# Patient Record
Sex: MALE | Race: WHITE | NOT HISPANIC OR LATINO | Employment: FULL TIME | URBAN - METROPOLITAN AREA
[De-identification: names, ages, dates, MRNs, and addresses within clinical notes are randomized per-mention and may not be internally consistent; named-entity substitution may affect disease eponyms.]

---

## 2020-10-15 ENCOUNTER — APPOINTMENT (OUTPATIENT)
Dept: GENERAL RADIOLOGY | Facility: HOSPITAL | Age: 65
End: 2020-10-15

## 2020-10-15 ENCOUNTER — HOSPITAL ENCOUNTER (INPATIENT)
Facility: HOSPITAL | Age: 65
LOS: 9 days | Discharge: HOME HEALTH | End: 2020-10-24
Attending: EMERGENCY MEDICINE | Admitting: HOSPITALIST

## 2020-10-15 DIAGNOSIS — J12.9 VIRAL PNEUMONIA: ICD-10-CM

## 2020-10-15 DIAGNOSIS — U07.1 PNEUMONIA DUE TO COVID-19 VIRUS: ICD-10-CM

## 2020-10-15 DIAGNOSIS — J12.82 PNEUMONIA DUE TO COVID-19 VIRUS: ICD-10-CM

## 2020-10-15 DIAGNOSIS — J96.01 ACUTE RESPIRATORY FAILURE WITH HYPOXIA (HCC): Primary | ICD-10-CM

## 2020-10-15 LAB
ALBUMIN SERPL-MCNC: 3.9 G/DL (ref 3.5–5.2)
ALBUMIN/GLOB SERPL: 1.5 G/DL
ALP SERPL-CCNC: 80 U/L (ref 39–117)
ALT SERPL W P-5'-P-CCNC: 40 U/L (ref 1–41)
ANION GAP SERPL CALCULATED.3IONS-SCNC: 11.8 MMOL/L (ref 5–15)
AST SERPL-CCNC: 46 U/L (ref 1–40)
B PARAPERT DNA SPEC QL NAA+PROBE: NOT DETECTED
B PERT DNA SPEC QL NAA+PROBE: NOT DETECTED
BASOPHILS # BLD AUTO: 0 10*3/MM3 (ref 0–0.2)
BASOPHILS NFR BLD AUTO: 0 % (ref 0–1.5)
BILIRUB SERPL-MCNC: 0.3 MG/DL (ref 0–1.2)
BUN SERPL-MCNC: 14 MG/DL (ref 8–23)
BUN/CREAT SERPL: 15.4 (ref 7–25)
C PNEUM DNA NPH QL NAA+NON-PROBE: NOT DETECTED
CALCIUM SPEC-SCNC: 9.2 MG/DL (ref 8.6–10.5)
CHLORIDE SERPL-SCNC: 99 MMOL/L (ref 98–107)
CO2 SERPL-SCNC: 24.2 MMOL/L (ref 22–29)
CREAT SERPL-MCNC: 0.91 MG/DL (ref 0.76–1.27)
D DIMER PPP FEU-MCNC: 0.63 MCGFEU/ML (ref 0–0.49)
D-LACTATE SERPL-SCNC: 1.2 MMOL/L (ref 0.5–2)
DEPRECATED RDW RBC AUTO: 43.2 FL (ref 37–54)
EOSINOPHIL # BLD AUTO: 0 10*3/MM3 (ref 0–0.4)
EOSINOPHIL NFR BLD AUTO: 0 % (ref 0.3–6.2)
ERYTHROCYTE [DISTWIDTH] IN BLOOD BY AUTOMATED COUNT: 13.6 % (ref 12.3–15.4)
FLUAV H1 2009 PAND RNA NPH QL NAA+PROBE: NOT DETECTED
FLUAV H1 HA GENE NPH QL NAA+PROBE: NOT DETECTED
FLUAV H3 RNA NPH QL NAA+PROBE: NOT DETECTED
FLUAV SUBTYP SPEC NAA+PROBE: NOT DETECTED
FLUBV RNA ISLT QL NAA+PROBE: NOT DETECTED
GFR SERPL CREATININE-BSD FRML MDRD: 83 ML/MIN/1.73
GLOBULIN UR ELPH-MCNC: 2.6 GM/DL
GLUCOSE SERPL-MCNC: 276 MG/DL (ref 65–99)
HADV DNA SPEC NAA+PROBE: NOT DETECTED
HCOV 229E RNA SPEC QL NAA+PROBE: NOT DETECTED
HCOV HKU1 RNA SPEC QL NAA+PROBE: NOT DETECTED
HCOV NL63 RNA SPEC QL NAA+PROBE: NOT DETECTED
HCOV OC43 RNA SPEC QL NAA+PROBE: NOT DETECTED
HCT VFR BLD AUTO: 44.4 % (ref 37.5–51)
HGB BLD-MCNC: 15.2 G/DL (ref 13–17.7)
HMPV RNA NPH QL NAA+NON-PROBE: NOT DETECTED
HPIV1 RNA SPEC QL NAA+PROBE: NOT DETECTED
HPIV2 RNA SPEC QL NAA+PROBE: NOT DETECTED
HPIV3 RNA NPH QL NAA+PROBE: NOT DETECTED
HPIV4 P GENE NPH QL NAA+PROBE: NOT DETECTED
LYMPHOCYTES # BLD AUTO: 0.75 10*3/MM3 (ref 0.7–3.1)
LYMPHOCYTES NFR BLD AUTO: 15.4 % (ref 19.6–45.3)
M PNEUMO IGG SER IA-ACNC: NOT DETECTED
MCH RBC QN AUTO: 29.9 PG (ref 26.6–33)
MCHC RBC AUTO-ENTMCNC: 34.2 G/DL (ref 31.5–35.7)
MCV RBC AUTO: 87.4 FL (ref 79–97)
MONOCYTES # BLD AUTO: 0.54 10*3/MM3 (ref 0.1–0.9)
MONOCYTES NFR BLD AUTO: 11.1 % (ref 5–12)
NEUTROPHILS NFR BLD AUTO: 3.55 10*3/MM3 (ref 1.7–7)
NEUTROPHILS NFR BLD AUTO: 72.9 % (ref 42.7–76)
PLATELET # BLD AUTO: 144 10*3/MM3 (ref 140–450)
PMV BLD AUTO: 11.4 FL (ref 6–12)
POTASSIUM SERPL-SCNC: 3.2 MMOL/L (ref 3.5–5.2)
PROCALCITONIN SERPL-MCNC: 0.06 NG/ML (ref 0–0.25)
PROT SERPL-MCNC: 6.5 G/DL (ref 6–8.5)
RBC # BLD AUTO: 5.08 10*6/MM3 (ref 4.14–5.8)
RHINOVIRUS RNA SPEC NAA+PROBE: NOT DETECTED
RSV RNA NPH QL NAA+NON-PROBE: NOT DETECTED
SARS-COV-2 RNA NPH QL NAA+NON-PROBE: DETECTED
SODIUM SERPL-SCNC: 135 MMOL/L (ref 136–145)
WBC # BLD AUTO: 4.87 10*3/MM3 (ref 3.4–10.8)

## 2020-10-15 PROCEDURE — 99284 EMERGENCY DEPT VISIT MOD MDM: CPT

## 2020-10-15 PROCEDURE — 83605 ASSAY OF LACTIC ACID: CPT | Performed by: NURSE PRACTITIONER

## 2020-10-15 PROCEDURE — G0378 HOSPITAL OBSERVATION PER HR: HCPCS

## 2020-10-15 PROCEDURE — 93010 ELECTROCARDIOGRAM REPORT: CPT | Performed by: INTERNAL MEDICINE

## 2020-10-15 PROCEDURE — 84145 PROCALCITONIN (PCT): CPT | Performed by: NURSE PRACTITIONER

## 2020-10-15 PROCEDURE — 80053 COMPREHEN METABOLIC PANEL: CPT | Performed by: NURSE PRACTITIONER

## 2020-10-15 PROCEDURE — 71045 X-RAY EXAM CHEST 1 VIEW: CPT

## 2020-10-15 PROCEDURE — 93005 ELECTROCARDIOGRAM TRACING: CPT | Performed by: NURSE PRACTITIONER

## 2020-10-15 PROCEDURE — 87040 BLOOD CULTURE FOR BACTERIA: CPT | Performed by: NURSE PRACTITIONER

## 2020-10-15 PROCEDURE — 25010000002 DEXAMETHASONE PER 1 MG: Performed by: EMERGENCY MEDICINE

## 2020-10-15 PROCEDURE — 85379 FIBRIN DEGRADATION QUANT: CPT | Performed by: EMERGENCY MEDICINE

## 2020-10-15 PROCEDURE — 85025 COMPLETE CBC W/AUTO DIFF WBC: CPT | Performed by: NURSE PRACTITIONER

## 2020-10-15 PROCEDURE — 0202U NFCT DS 22 TRGT SARS-COV-2: CPT | Performed by: NURSE PRACTITIONER

## 2020-10-15 RX ORDER — LOSARTAN POTASSIUM 50 MG/1
100 TABLET ORAL NIGHTLY
COMMUNITY

## 2020-10-15 RX ORDER — ACETAMINOPHEN 160 MG/5ML
650 SOLUTION ORAL EVERY 4 HOURS PRN
Status: DISCONTINUED | OUTPATIENT
Start: 2020-10-15 | End: 2020-10-24 | Stop reason: HOSPADM

## 2020-10-15 RX ORDER — ICOSAPENT ETHYL 1000 MG/1
2 CAPSULE ORAL NIGHTLY
COMMUNITY

## 2020-10-15 RX ORDER — ONDANSETRON 2 MG/ML
4 INJECTION INTRAMUSCULAR; INTRAVENOUS EVERY 6 HOURS PRN
Status: DISCONTINUED | OUTPATIENT
Start: 2020-10-15 | End: 2020-10-24 | Stop reason: HOSPADM

## 2020-10-15 RX ORDER — SODIUM CHLORIDE 0.9 % (FLUSH) 0.9 %
10 SYRINGE (ML) INJECTION EVERY 12 HOURS SCHEDULED
Status: DISCONTINUED | OUTPATIENT
Start: 2020-10-15 | End: 2020-10-24 | Stop reason: HOSPADM

## 2020-10-15 RX ORDER — SODIUM CHLORIDE 0.9 % (FLUSH) 0.9 %
10 SYRINGE (ML) INJECTION AS NEEDED
Status: DISCONTINUED | OUTPATIENT
Start: 2020-10-15 | End: 2020-10-24 | Stop reason: HOSPADM

## 2020-10-15 RX ORDER — DEXAMETHASONE SODIUM PHOSPHATE 4 MG/ML
6 INJECTION, SOLUTION INTRA-ARTICULAR; INTRALESIONAL; INTRAMUSCULAR; INTRAVENOUS; SOFT TISSUE ONCE
Status: COMPLETED | OUTPATIENT
Start: 2020-10-15 | End: 2020-10-15

## 2020-10-15 RX ORDER — ACETAMINOPHEN 650 MG/1
650 SUPPOSITORY RECTAL EVERY 4 HOURS PRN
Status: DISCONTINUED | OUTPATIENT
Start: 2020-10-15 | End: 2020-10-24 | Stop reason: HOSPADM

## 2020-10-15 RX ORDER — ACETAMINOPHEN 325 MG/1
650 TABLET ORAL EVERY 4 HOURS PRN
Status: DISCONTINUED | OUTPATIENT
Start: 2020-10-15 | End: 2020-10-24 | Stop reason: HOSPADM

## 2020-10-15 RX ORDER — NITROGLYCERIN 0.4 MG/1
0.4 TABLET SUBLINGUAL
Status: DISCONTINUED | OUTPATIENT
Start: 2020-10-15 | End: 2020-10-24 | Stop reason: HOSPADM

## 2020-10-15 RX ORDER — AMLODIPINE BESYLATE 10 MG/1
10 TABLET ORAL NIGHTLY
COMMUNITY
End: 2020-10-24 | Stop reason: HOSPADM

## 2020-10-15 RX ORDER — POTASSIUM CHLORIDE 750 MG/1
40 CAPSULE, EXTENDED RELEASE ORAL ONCE
Status: COMPLETED | OUTPATIENT
Start: 2020-10-15 | End: 2020-10-15

## 2020-10-15 RX ORDER — TAMSULOSIN HYDROCHLORIDE 0.4 MG/1
1 CAPSULE ORAL NIGHTLY
COMMUNITY

## 2020-10-15 RX ADMIN — POTASSIUM CHLORIDE 40 MEQ: 10 CAPSULE, COATED, EXTENDED RELEASE ORAL at 19:08

## 2020-10-15 RX ADMIN — DEXAMETHASONE SODIUM PHOSPHATE 6 MG: 4 INJECTION, SOLUTION INTRAMUSCULAR; INTRAVENOUS at 19:29

## 2020-10-15 NOTE — ED PROVIDER NOTES
EMERGENCY DEPARTMENT ENCOUNTER    Room Number:  35/35  Date seen:  10/15/2020  Time seen: 17:23 EDT  PCP: Provider, No Known  Historian: patient    HPI:  Chief complaint:difficulty breathing  A complete HPI/ROS/PMH/PSH/SH/FH are unobtainable due to: n/a  Context:Donta Maria is a 65 y.o. male who presents to the ED with c/o 24 hours of worsening shortness of breath described as moderate related to his COVID 19 which he was informed about on Monday.  He states it is made worse by exertion and not better by anything.  He has nausea, fever that does seem to respond to Tylenol arthritis formula, headache and body aches.  States he is completely miserable.  He has h/o RA but is not on any medications currently for this.  He has no history of asthma and has not smoked for many years.      Patient was placed in face mask in first look. Patient was wearing facemask when I entered the room and throughout our encounter. I wore full protective equipment throughout this patient encounter including a face mask, gown, eye shield and gloves. Hand hygiene/washing of hands was performed before donning protective equipment and after removal when leaving the room.      MEDICAL RECORD REVIEW    ALLERGIES  Lisinopril    PAST MEDICAL HISTORY  Active Ambulatory Problems     Diagnosis Date Noted   • No Active Ambulatory Problems     Resolved Ambulatory Problems     Diagnosis Date Noted   • No Resolved Ambulatory Problems     No Additional Past Medical History       PAST SURGICAL HISTORY  No past surgical history on file.    FAMILY HISTORY  No family history on file.    SOCIAL HISTORY  Social History     Socioeconomic History   • Marital status: Single     Spouse name: Not on file   • Number of children: Not on file   • Years of education: Not on file   • Highest education level: Not on file       REVIEW OF SYSTEMS  Review of Systems    All systems reviewed and negative except for those discussed in HPI.     PHYSICAL EXAM    ED Triage  Vitals   Temp Heart Rate Resp BP SpO2   10/15/20 1649 10/15/20 1645 10/15/20 1645 10/15/20 1647 10/15/20 1645   99.7 °F (37.6 °C) 81 20 120/84 (!) 87 %      Temp src Heart Rate Source Patient Position BP Location FiO2 (%)   10/15/20 1649 10/15/20 1645 10/15/20 1647 10/15/20 1647 --   Tympanic Monitor Sitting Right arm      Physical Exam    I have reviewed the triage vital signs and nursing notes.      GENERAL: mild distress, not toxic appearing  HENT: nares patent  EYES: no scleral icterus  NECK: no ROM limitations  CV: regular rhythm, regular rate, no murmur, no rubs, no gallups  RESPIRATORY: increased WOB, appears mildly short of breath, he is not normally on any oxygen.  He has diminished breath sounds  ABDOMEN: soft  : deferred  MUSCULOSKELETAL: no deformity  NEURO: alert, moves all extremities, follows commands  SKIN: warm, dry    LAB RESULTS  Recent Results (from the past 24 hour(s))   Comprehensive Metabolic Panel    Collection Time: 10/15/20  6:08 PM    Specimen: Blood   Result Value Ref Range    Glucose 276 (H) 65 - 99 mg/dL    BUN 14 8 - 23 mg/dL    Creatinine 0.91 0.76 - 1.27 mg/dL    Sodium 135 (L) 136 - 145 mmol/L    Potassium 3.2 (L) 3.5 - 5.2 mmol/L    Chloride 99 98 - 107 mmol/L    CO2 24.2 22.0 - 29.0 mmol/L    Calcium 9.2 8.6 - 10.5 mg/dL    Total Protein 6.5 6.0 - 8.5 g/dL    Albumin 3.90 3.50 - 5.20 g/dL    ALT (SGPT) 40 1 - 41 U/L    AST (SGOT) 46 (H) 1 - 40 U/L    Alkaline Phosphatase 80 39 - 117 U/L    Total Bilirubin 0.3 0.0 - 1.2 mg/dL    eGFR Non African Amer 83 >60 mL/min/1.73    Globulin 2.6 gm/dL    A/G Ratio 1.5 g/dL    BUN/Creatinine Ratio 15.4 7.0 - 25.0    Anion Gap 11.8 5.0 - 15.0 mmol/L   Lactic Acid, Plasma    Collection Time: 10/15/20  6:08 PM    Specimen: Blood   Result Value Ref Range    Lactate 1.2 0.5 - 2.0 mmol/L   CBC Auto Differential    Collection Time: 10/15/20  6:08 PM    Specimen: Blood   Result Value Ref Range    WBC 4.87 3.40 - 10.80 10*3/mm3    RBC 5.08 4.14 -  5.80 10*6/mm3    Hemoglobin 15.2 13.0 - 17.7 g/dL    Hematocrit 44.4 37.5 - 51.0 %    MCV 87.4 79.0 - 97.0 fL    MCH 29.9 26.6 - 33.0 pg    MCHC 34.2 31.5 - 35.7 g/dL    RDW 13.6 12.3 - 15.4 %    RDW-SD 43.2 37.0 - 54.0 fl    MPV 11.4 6.0 - 12.0 fL    Platelets 144 140 - 450 10*3/mm3    Neutrophil % 72.9 42.7 - 76.0 %    Lymphocyte % 15.4 (L) 19.6 - 45.3 %    Monocyte % 11.1 5.0 - 12.0 %    Eosinophil % 0.0 (L) 0.3 - 6.2 %    Basophil % 0.0 0.0 - 1.5 %    Neutrophils, Absolute 3.55 1.70 - 7.00 10*3/mm3    Lymphocytes, Absolute 0.75 0.70 - 3.10 10*3/mm3    Monocytes, Absolute 0.54 0.10 - 0.90 10*3/mm3    Eosinophils, Absolute 0.00 0.00 - 0.40 10*3/mm3    Basophils, Absolute 0.00 0.00 - 0.20 10*3/mm3   Procalcitonin    Collection Time: 10/15/20  6:08 PM    Specimen: Blood   Result Value Ref Range    Procalcitonin 0.06 0.00 - 0.25 ng/mL         RADIOLOGY RESULTS  XR Chest AP   Final Result            PROGRESS, DATA ANALYSIS, CONSULTS AND MEDICAL DECISION MAKING  All labs have been independently reviewed by me.  All radiology studies have been reviewed by me and discussed with radiologist dictating the report.  EKG's independently viewed and interpreted by me unless stated otherwise. Discussion below represents my analysis of pertinent findings related to patient's condition, differential diagnosis, treatment plan and final disposition.     ED Course as of Oct 15 2013   Thu Oct 15, 2020   1736 Oxygen saturations 90% on 4 liters per minute.     [EW]   1807 Discussed CXR with Dr. Barber, Radiologist concern for right sided pneumonia.     [EW]   1815 Pt had positive covid 19 on 10/8/2020, he is worse.  I did not repeat covid 19.     [EW]   1849 EKG          EKG time: 1748  Rhythm/Rate: 75, sinus rhythm  P waves and CT: normal CT, normal BISI  QRS, axis: normal QRS, LAFB  ST and T waves: no acute ST/T wave abnormalities    Interpreted Contemporaneously by me, independently viewed  No prior available for comparison      [EW]  "  1854 I have ordered repeat covid testing.     [EW]   1920 I discussed patient with Dr. Nash on call for [facility], he agrees to admit and requested d-dimer.  He states we do not need to wait for dimer results prior to admission.      [EW]      ED Course User Index  [EW] Lanie Manuel, APRN     DDX: covid 19, covid 19 pneumonia, acute hypoxia    MDM: Pt with new onset hypoxia and was 87% on RA when he arrived.  Still with oxygen saturations 92% on 4 liters.  He feels terrible.  Other labs are not acute.  D-dimer pending at time of admission and Dr. Nash aware.  Pt did receive potassium and decadron in ED.     Reviewed pt's history and workup with Dr. Artis.  After a bedside evaluation, Dr. Artis agrees with the plan of care.    Based on the patient's lab findings and presenting symptoms, the doctor and I feel it is appropriate to admit the patient for further management, evaluation, and treatment.  I have discussed this with the admitting team.  I have also discussed this with the patient/family.  They are in agreement with admission.          Disposition vitals:  /82   Pulse 73   Temp 99.7 °F (37.6 °C) (Tympanic)   Resp 20   Ht 180.3 cm (71\")   Wt 102 kg (225 lb)   SpO2 93%   BMI 31.38 kg/m²       DIAGNOSIS  Final diagnoses:   Acute respiratory failure with hypoxia (CMS/HCC)   Pneumonia due to COVID-19 virus       Admission     Lanie Manuele, APRN  10/15/20 2015    "

## 2020-10-15 NOTE — ED PROVIDER NOTES
Pt presents to the ED c/o  24 hours of increasing shortness of breath with recent diagnosis of COVID on Monday.  Reports fatigue, body aches, diarrhea.     On exam,   General: Awake, alert, ill-appearing  HEENT: Mucous membranes moist, atraumatic, normocephalic, EOMI  Neck: Full ROM  Pulm: Symmetric, mild tachypnea, mild scattered rales  Cardiovascular: Regular rate and rhythm, normal S1/S2, intact distal pulses  GI: Soft, nontender, nondistended, no rebound, no guarding, bowel sounds present  MSK: Full ROM, no deformity  Skin: Warm, dry  Neuro: Alert and oriented x 3, GCS 15, moving all extremities, no focal deficits  Psych: Calm, cooperative      Surgical mask, protective eye goggles, and gloves used during this encounter. Patient in surgical mask.    Critical Care  Performed by: Stu Artis MD  Authorized by: Stu Artis MD     Critical care provider statement:     Critical care time (minutes):  34    Critical care time was exclusive of:  Separately billable procedures and treating other patients    Critical care was necessary to treat or prevent imminent or life-threatening deterioration of the following conditions:  Respiratory failure    Critical care was time spent personally by me on the following activities:  Development of treatment plan with patient or surrogate, discussions with consultants, evaluation of patient's response to treatment, examination of patient, obtaining history from patient or surrogate, ordering and performing treatments and interventions, ordering and review of laboratory studies, ordering and review of radiographic studies, pulse oximetry, re-evaluation of patient's condition and review of old charts (COVID pneumonia with hypoxia requiring supplemental O2 and steroids)          Plan:   ED Course as of Oct 15 2129   Thu Oct 15, 2020   1736 Oxygen saturations 90% on 4 liters per minute.     [EW]   1807 Discussed CXR with Dr. Barber, Radiologist concern for right sided pneumonia.      [EW]   1815 Pt had positive covid 19 on 10/8/2020, he is worse.  I did not repeat covid 19.     [EW]   1849 EKG          EKG time: 1748  Rhythm/Rate: 75, sinus rhythm  P waves and UT: normal UT, normal BISI  QRS, axis: normal QRS, LAFB  ST and T waves: no acute ST/T wave abnormalities    Interpreted Contemporaneously by me, independently viewed  No prior available for comparison      [EW]   1854 I have ordered repeat covid testing.     [EW]   1920 I discussed patient with Dr. Nash on call for [facility], he agrees to admit and requested d-dimer.  He states we do not need to wait for dimer results prior to admission.      [EW]      ED Course User Index  [EW] Lanie Manuel APRN     Plan for hospitalization with the pneumonia noted on x-ray, the hypoxia requiring oxygen supplementation.  Will give him a dose of steroids.     Attestation:  The YEN and I have discussed this patient's history, physical exam, and treatment plan.  I have reviewed the documentation and personally had a face to face interaction with the patient. I affirm the documentation and agree with the treatment and plan.  The attached note describes my personal findings.          Stu Artis MD  10/15/20 2131       Stu Artis MD  10/15/20 2133

## 2020-10-15 NOTE — ED NOTES
Pt presents to ED with complaints of COVID+ results two days ago. Pt complaints of increased SOA and loss of appetite. Pt complaints of cough and reports a fever at home. Pt is A&OX4, able to ambulate but placed in wheelchair, and in a mask at this time.     Bin Senior, RN  10/15/20 7846

## 2020-10-16 PROBLEM — E87.6 HYPOKALEMIA: Status: ACTIVE | Noted: 2020-10-16

## 2020-10-16 PROBLEM — E87.1 HYPONATREMIA: Status: ACTIVE | Noted: 2020-10-16

## 2020-10-16 PROBLEM — U07.1 PNEUMONIA DUE TO COVID-19 VIRUS: Status: ACTIVE | Noted: 2020-10-16

## 2020-10-16 PROBLEM — R79.89 LFTS ABNORMAL: Status: ACTIVE | Noted: 2020-10-16

## 2020-10-16 PROBLEM — J12.82 PNEUMONIA DUE TO COVID-19 VIRUS: Status: ACTIVE | Noted: 2020-10-16

## 2020-10-16 PROBLEM — J12.9 VIRAL PNEUMONIA: Status: ACTIVE | Noted: 2020-10-16

## 2020-10-16 LAB
ABO GROUP BLD: NORMAL
ALBUMIN SERPL-MCNC: 3.4 G/DL (ref 3.5–5.2)
ALP SERPL-CCNC: 82 U/L (ref 39–117)
ALT SERPL W P-5'-P-CCNC: 35 U/L (ref 1–41)
AST SERPL-CCNC: 37 U/L (ref 1–40)
BILIRUB CONJ SERPL-MCNC: <0.2 MG/DL (ref 0–0.3)
BILIRUB INDIRECT SERPL-MCNC: ABNORMAL MG/DL
BILIRUB SERPL-MCNC: 0.3 MG/DL (ref 0–1.2)
BLD GP AB SCN SERPL QL: NEGATIVE
CREAT SERPL-MCNC: 0.84 MG/DL (ref 0.76–1.27)
CRP SERPL-MCNC: 5.86 MG/DL (ref 0–0.5)
DEPRECATED RDW RBC AUTO: 42.3 FL (ref 37–54)
ERYTHROCYTE [DISTWIDTH] IN BLOOD BY AUTOMATED COUNT: 13.3 % (ref 12.3–15.4)
GFR SERPL CREATININE-BSD FRML MDRD: 91 ML/MIN/1.73
HCT VFR BLD AUTO: 42.6 % (ref 37.5–51)
HGB BLD-MCNC: 14.8 G/DL (ref 13–17.7)
MCH RBC QN AUTO: 30.3 PG (ref 26.6–33)
MCHC RBC AUTO-ENTMCNC: 34.7 G/DL (ref 31.5–35.7)
MCV RBC AUTO: 87.3 FL (ref 79–97)
PLATELET # BLD AUTO: 143 10*3/MM3 (ref 140–450)
PMV BLD AUTO: 11.6 FL (ref 6–12)
PROT SERPL-MCNC: 6.5 G/DL (ref 6–8.5)
RBC # BLD AUTO: 4.88 10*6/MM3 (ref 4.14–5.8)
RH BLD: POSITIVE
T&S EXPIRATION DATE: NORMAL
WBC # BLD AUTO: 3.8 10*3/MM3 (ref 3.4–10.8)

## 2020-10-16 PROCEDURE — 86850 RBC ANTIBODY SCREEN: CPT | Performed by: INTERNAL MEDICINE

## 2020-10-16 PROCEDURE — 25010000002 ENOXAPARIN PER 10 MG: Performed by: HOSPITALIST

## 2020-10-16 PROCEDURE — 63710000001 DEXAMETHASONE PER 0.25 MG: Performed by: HOSPITALIST

## 2020-10-16 PROCEDURE — 86140 C-REACTIVE PROTEIN: CPT | Performed by: INTERNAL MEDICINE

## 2020-10-16 PROCEDURE — 86901 BLOOD TYPING SEROLOGIC RH(D): CPT | Performed by: INTERNAL MEDICINE

## 2020-10-16 PROCEDURE — 99255 IP/OBS CONSLTJ NEW/EST HI 80: CPT | Performed by: INTERNAL MEDICINE

## 2020-10-16 PROCEDURE — 36415 COLL VENOUS BLD VENIPUNCTURE: CPT | Performed by: NURSE PRACTITIONER

## 2020-10-16 PROCEDURE — 82565 ASSAY OF CREATININE: CPT | Performed by: INTERNAL MEDICINE

## 2020-10-16 PROCEDURE — 86900 BLOOD TYPING SEROLOGIC ABO: CPT | Performed by: INTERNAL MEDICINE

## 2020-10-16 PROCEDURE — 80076 HEPATIC FUNCTION PANEL: CPT | Performed by: INTERNAL MEDICINE

## 2020-10-16 PROCEDURE — 85027 COMPLETE CBC AUTOMATED: CPT | Performed by: NURSE PRACTITIONER

## 2020-10-16 PROCEDURE — XW033E5 INTRODUCTION OF REMDESIVIR ANTI-INFECTIVE INTO PERIPHERAL VEIN, PERCUTANEOUS APPROACH, NEW TECHNOLOGY GROUP 5: ICD-10-PCS | Performed by: INTERNAL MEDICINE

## 2020-10-16 RX ORDER — FAMOTIDINE 20 MG/1
20 TABLET, FILM COATED ORAL
Status: DISCONTINUED | OUTPATIENT
Start: 2020-10-16 | End: 2020-10-24 | Stop reason: HOSPADM

## 2020-10-16 RX ORDER — LOSARTAN POTASSIUM 100 MG/1
100 TABLET ORAL NIGHTLY
Status: DISCONTINUED | OUTPATIENT
Start: 2020-10-16 | End: 2020-10-24 | Stop reason: HOSPADM

## 2020-10-16 RX ORDER — AMLODIPINE BESYLATE 10 MG/1
10 TABLET ORAL
Status: DISCONTINUED | OUTPATIENT
Start: 2020-10-16 | End: 2020-10-18

## 2020-10-16 RX ORDER — TAMSULOSIN HYDROCHLORIDE 0.4 MG/1
0.4 CAPSULE ORAL NIGHTLY
Status: DISCONTINUED | OUTPATIENT
Start: 2020-10-16 | End: 2020-10-24 | Stop reason: HOSPADM

## 2020-10-16 RX ADMIN — FAMOTIDINE 20 MG: 20 TABLET, FILM COATED ORAL at 12:34

## 2020-10-16 RX ADMIN — DEXAMETHASONE 6 MG: 4 TABLET ORAL at 08:50

## 2020-10-16 RX ADMIN — FAMOTIDINE 20 MG: 20 TABLET, FILM COATED ORAL at 18:00

## 2020-10-16 RX ADMIN — LOSARTAN POTASSIUM 100 MG: 100 TABLET, FILM COATED ORAL at 19:59

## 2020-10-16 RX ADMIN — TAMSULOSIN HYDROCHLORIDE 0.4 MG: 0.4 CAPSULE ORAL at 19:59

## 2020-10-16 RX ADMIN — AMLODIPINE BESYLATE 10 MG: 10 TABLET ORAL at 12:34

## 2020-10-16 RX ADMIN — SODIUM CHLORIDE, PRESERVATIVE FREE 10 ML: 5 INJECTION INTRAVENOUS at 19:59

## 2020-10-16 RX ADMIN — ENOXAPARIN SODIUM 40 MG: 40 INJECTION SUBCUTANEOUS at 08:50

## 2020-10-16 RX ADMIN — REMDESIVIR 200 MG: 100 INJECTION, POWDER, LYOPHILIZED, FOR SOLUTION INTRAVENOUS at 15:31

## 2020-10-16 NOTE — PLAN OF CARE
Goal Outcome Evaluation:  Plan of Care Reviewed With: patient  Progress: no change  Outcome Summary: Remdesivir and Convalescent Plasma ordered per ID.  Took Decadron this AM.  Pt currently on 7lpm N/C satting 91%.  Will change to hi flow with humidifier.  VSS.  Will cont to monitor.

## 2020-10-16 NOTE — PROGRESS NOTES
[facility]  Clinical Pharmacy Department     Remdesivir Review Note    Donta Maria is a 65 y.o. male with confirmed COVID-19 infection on day 2 of hospitalization.     Consulting Provider:  Dr Garcia  Date of Confirmed SARS-CoV-2: 10/8/20  Date of Symptom Onset: 10/4/20  Planned Duration of Therapy: 5 days  Other Antimicrobials: no  Hydroxychloroquine or chloroquine prior to arrival: no    Allergies  Allergies as of 10/15/2020 - Reviewed 10/15/2020   Allergen Reaction Noted    Lisinopril Other (See Comments) 03/19/2019       Microbiology:  Microbiology Results (last 10 days)       Procedure Component Value - Date/Time    Respiratory Panel PCR w/COVID-19(SARS-CoV-2) [facility] In-House, NP Swab in UTM/VTM, 3-4 HR TAT - Swab, Nasopharynx [[id number]]  (Abnormal) Collected: 10/15/20 1911    Lab Status: Final result Specimen: Swab from Nasopharynx Updated: 10/15/20 2128     ADENOVIRUS, PCR Not Detected     Coronavirus 229E Not Detected     Coronavirus HKU1 Not Detected     Coronavirus NL63 Not Detected     Coronavirus OC43 Not Detected     COVID19 Detected     Human Metapneumovirus Not Detected     Human Rhinovirus/Enterovirus Not Detected     Influenza A PCR Not Detected     Influenza A H1 Not Detected     Influenza A H1 2009 PCR Not Detected     Influenza A H3 Not Detected     Influenza B PCR Not Detected     Parainfluenza Virus 1 Not Detected     Parainfluenza Virus 2 Not Detected     Parainfluenza Virus 3 Not Detected     Parainfluenza Virus 4 Not Detected     RSV, PCR Not Detected     Bordetella pertussis pcr Not Detected     Bordetella parapertussis PCR Not Detected     Chlamydophila pneumoniae PCR Not Detected     Mycoplasma pneumo by PCR Not Detected    Narrative:      Fact sheet for providers: https://docs.[employer].InboundWriter/wp-content/uploads/SIV5837-5436-FR3.1-EUA-Provider-Fact-Sheet-3.pdf    Fact sheet for patients:  https://docs.[employer]/wp-content/uploads/KJZ0989-7335-DP1.1-EUA-Patient-Fact-Sheet-1.pdf            Vitals/Labs/I&O  [unfilled]    Results from last 7 days   Lab Units 10/16/20  0745 10/15/20  1808   WBC 10*3/mm3 3.80 4.87     Results from last 7 days   Lab Units 10/15/20  1808   PROCALCITONIN ng/mL 0.06     Results from last 7 days   Lab Units 10/16/20  1325 10/15/20  1808   AST (SGOT) U/L 37 46*      Results from last 7 days   Lab Units 10/16/20  1325 10/15/20  1808   ALT (SGPT) U/L 35 40       Estimated Creatinine Clearance: 101.3 mL/min (by C-G formula based on SCr of 0.84 mg/dL).  Results from last 7 days   Lab Units 10/16/20  1325 10/15/20  1808   BUN mg/dL  --  14   CREATININE mg/dL 0.84 0.91     Intake & Output (last 3 days)         10/13 0701 - 10/14 0700 10/14 0701 - 10/15 0700 10/15 0701 - 10/16 0700 10/16 0701 - 10/17 0700    P.O.    240    Total Intake(mL/kg)    240 (2.6)    Net    +240                    Assessment/Plan:    Patient is hospitalized with confirmed, severe COVID-19 infection and started on remdesivir 200 mg IV once followed by 100 mg IV daily for 5 days. All requirements listed below have been reviewed and meet the requirement for Emergency Use Authorization (EUA) of remdesivir for COVID-19 infection.   Patient is hospitalized with confirmed COVID-19 infection  Documentation of consent and the fact sheet provided to patient or caregiver in the EHR  Baseline and daily LFTs and Scr have been ordered prior to remdesivir initiation  ALT is not ? 5 times the upper limit of normal  Patient is not on concomitant hydroxychloroquine or chloroquine       Thank you for involving pharmacy in this patient's care. Please contact pharmacy with any questions or concerns.                           Aminah Garrido Pharm.D., [facility]  Clinical Pharmacist  10/16/20 14:16 EDT

## 2020-10-16 NOTE — CONSULTS
Referring Provider: Dr Schroeder for COVID    Subjective   History of present illness: 65-year-old with history of rheumatoid arthritis, on no immunosuppression, diabetes mellitus type 2 on metformin and hypertension we are asked to see for COVID.  He says he started feeling unwell on 10/4/2020 when he developed headache, myalgias, intermittent fevers, nonproductive cough and shortness of breath.  He tested positive for COVID-19 on 10/8/2020.  He progressively worsened and came to the emergency department yesterday evening.  He was admitted and started on supplemental oxygen up to 7 L now and dexamethasone.  He does feel better with these interventions, but is far from his baseline.  He denies any loss of taste or smell but has a poor appetite.    Past medical history: Diabetes mellitus 2, hypertension, RA  No family history of infection or COVID  Social history: He works with [employer] where he thinks he got COVID.  Non-smoker.  Lives in [place], [place] alone      Allergies   Allergen Reactions   • Lisinopril Other (See Comments)     Dry cough       Review of Systems  Pertinent items are noted in HPI, all other systems reviewed and negative    Objective     Physical Exam:   Vital Signs   Temp:  [97.4 °F (36.3 °C)-99.7 °F (37.6 °C)] 97.4 °F (36.3 °C)  Heart Rate:  [64-81] 64  Resp:  [18-22] 22  BP: (120-132)/(67-84) 132/74    GENERAL: Awake and alert, in no acute distress.   HEENT: Oropharynx is clear. Hearing is grossly normal.   EYES: PERRL. No conjunctival injection. No lid lag.   LYMPHATICS: No lymphadenopathy of the neck or inguinal regions.   HEART: Regular rate and regular rhythm. No peripheral edema.   LUNGS: distant with normal respiratory effort.   GI: Soft, nontender, nondistended. No appreciable organomegaly.   SKIN: Warm and dry without cutaneous eruptions   PSYCHIATRIC: Appropriate mood, affect, insight, and judgment.     Results Review:  WBC 3.8    Hgb 14.8  PCT 0.06  LFTs nl except ast  46  ddimer 0.63  Microbiology:  10/15 BCx 2/2 ngtd  10/15 RPP COVID19+    Radiology:   CXR, independently interpreted: No penelope pna. Possible RLL GGO per radiology    Assessment/Plan   1.  COVID-19 pneumonia/lung injury/evolving ARDS  2.  Acute hypoxic respiratory failure secondary #1    I suspect that the patient is entering into a hyper inflammatory/cytokine storm phase of the infection.  I will check CRP.  I agree with dexamethasone and supplemental oxygen.  I discussed with him experimental therapies with Remdesivir and convalescent plasma.  Fact sheets on both provided to the patient.  He is agreeable to proceed with both.  Type and screen ordered.  Remdesivir for 5-day course ordered as dosed by pharmacy.  Recommend checking a daily liver function test    He understands that if these therapies provide any benefit whatsoever, they are likely only to provide at most a modest benefit.      I have discussed with the patient the following regarding Remdesivir    1. FDA has authorized emergency use of Remdesivir which is not FDA approved    2. The patient or caregiver has the option to accept or refuse administration of Remdesivir    3. The significant known and potential risks and benefits of Remdesivir and the extent to which such risks and benefits are unknown     4. Information on available alternative treatments and the risks and benefits of those alternatives.   I have discussed with the patient the following regarding convalescent plasma    1. FDA has authorized emergency use of COVID-19 convalescent plasma, which is not an FDA-approved biological product     2. The patient or caregiver has the option to accept or refuse administration of COVID-19 convalescent plasma     3. The significant known and potential risks and benefits of COVID-19 convalescent plasma and the extent to which such risks and benefits are unknown     4. Information on available alternative treatments and the risks and benefits of those  alternatives.       Beyond the above interventions, I have nothing more to offer him.    Therefore I will sign off       Stuart Garcia MD  10/16/20  12:01 EDT

## 2020-10-16 NOTE — PLAN OF CARE
Goal Outcome Evaluation:  Plan of Care Reviewed With: patient  Progress: no change  Outcome Summary: Pt A & O, up ad pedro, on 4L O2 NC. Medicated per orders. No s/s of distress at this time, VSS, will cont to monitor.

## 2020-10-16 NOTE — PLAN OF CARE
Goal Outcome Evaluation:  Plan of Care Reviewed With: patient  Progress: no change  Pt admitted last night.  Pt gets SOA on exertion and is on 4lpm versus RA at home.  VSS.  Will cont to monitor.

## 2020-10-16 NOTE — H&P
HISTORY AND PHYSICAL   [facility]        Patient Identification:  Name: Donta Maria  Age: 65 y.o.  Sex: male  :  1955  MRN: [id number]                     Primary Care Physician: Provider, No Known    Chief Complaint: Shortness of breath    History of Present Illness:   Mr Maria is a wonderfully pleasant 65-year-old male who does have a past medical history of diabetes of which he takes metformin.  He thinks his previous A1c was around the 8 range and he is followed by endocrinologist Dr. Mcfadden in an outpatient setting.  He is here today because he has had a week's worth of some unusual symptoms consistent with cold-like illness.  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  He states he is been around numerous exposures with people tested positive for COVID.  He had been seen in his PCP I believe fairly recently and was prescribed some amoxicillin which she completed the course and did not have any improvement.  Along the way he is never been on any steroids or inhaled medications.  He denies any COPD and denies tobacco usage.  He continued to have shortness of breath which was starting to stop him in his tracks so he came to the ER for further evaluation.  He was found to be COVID positive.  He is also had associated complaints such as headache nausea decreased appetite but denies any emesis or abdominal pain.  He is had a couple loose stools along the way.  Since admission patient's O2 is currently 7 L via nasal cannula.  He is short of breath with exertion even with use oxygen though during my exam he was staying around 93%.      Past Medical History:  Past Medical History:   Diagnosis Date   • Diabetes mellitus (CMS/[facility])    • Hypertension    • Rheumatoid arthritis (CMS/[facility])    • Rheumatoid arthritis (CMS/[facility])      Past Surgical History:  History reviewed. No pertinent surgical history.   Home Meds:  Medications Prior to Admission    Medication Sig Dispense Refill Last Dose   • amLODIPine (NORVASC) 10 MG tablet Take 10 mg by mouth Every Night.      • icosapent ethyl (Vascepa) 1 g capsule capsule Take 2 g by mouth Every Night.      • losartan (COZAAR) 50 MG tablet Take 100 mg by mouth Every Night.      • metFORMIN (GLUCOPHAGE) 1000 MG tablet Take 2,000 mg by mouth Every Night.      • tamsulosin (FLOMAX) 0.4 MG capsule 24 hr capsule Take 1 capsule by mouth Every Night.          Allergies:  Allergies   Allergen Reactions   • Lisinopril Other (See Comments)     Dry cough     Immunizations:    There is no immunization history on file for this patient.  Social History:   Social History     Social History Narrative   • Not on file     Social History     Socioeconomic History   • Marital status: Single     Spouse name: Not on file   • Number of children: Not on file   • Years of education: Not on file   • Highest education level: Not on file   Tobacco Use   • Smoking status: Former Smoker     Types: Cigarettes     Quit date: 11/15/2008     Years since quittin.9   • Smokeless tobacco: Former User   Substance and Sexual Activity   • Alcohol use: Yes     Comment: rarely   • Sexual activity: Defer       Family History:  History reviewed. No pertinent family history.     Review of Systems  See history of present illness and past medical history.  Patient admits to subjective fevers and chills but denies night sweats.  Admits to some nausea without emesis though has occasional loose stools.  Denies any neck stiffness or rigidity focal changes to vision smell taste or sound.  Patient will have any issues of chest pain or palpitations but admits to mainly dry cough as well as shortness of breath worse with exertion.  Denies abdominal pain dysuria bruising bleeding focal loss of function denies any tightness or redness to bilateral lower extremities.  Remainder of ROS is negative.    Objective:  T Max 24 hrs: Temp (24hrs), Av.9 °F (37.2 °C), Min:97.8  "°F (36.6 °C), Max:99.7 °F (37.6 °C)    Vitals Ranges:   Temp:  [97.8 °F (36.6 °C)-99.7 °F (37.6 °C)] 97.8 °F (36.6 °C)  Heart Rate:  [64-81] 64  Resp:  [18-20] 18  BP: (120-131)/(67-84) 123/67      Exam:  /67 (BP Location: Right arm, Patient Position: Lying)   Pulse 64   Temp 97.8 °F (36.6 °C) (Oral)   Resp 18   Ht 180.3 cm (71\")   Wt 94 kg (207 lb 3.7 oz)   SpO2 95%   BMI 28.90 kg/m²     General Appearance:    Alert, cooperative, sickly though alert and oriented x3, he does get little short of breath with conversation, no family at bedside   Head:    Normocephalic, without obvious abnormality, atraumatic   Eyes:    PERRL, conjunctivae/corneas clear, EOM's intact, both eyes   Ears:    Normal external ear canals, both ears   Nose:   Nares normal, septum midline, mucosa normal, no drainage    or sinus tenderness   Throat:   Lips, mucosa, and tongue normal   Neck:   Supple, no meningismus or LAD or JVD   Back:     Symmetric, no curvature   Lungs:    Surprisingly clear to auscultation bilaterally, respirations unlabored but you can tell he gets short of breath at times with conversation.  He did have some mild rhonchi noted in the mid right lung but but that cleared with deep breathing   Chest Wall:    No tenderness or deformity    Heart:    Regular rate and rhythm, S1 and S2 normal, no murmur   Abdomen:     Soft, nontender, bowel sounds active all four quadrants,     no masses   Extremities:   Extremities normal, atraumatic, no cyanosis or edema, negative Homans sign   Pulses:   2+ and symmetric all extremities   Skin:   Skin color, texture, turgor normal, no rashes or lesions   Lymph nodes:   Cervical and supraclavicular nodes normal   Neurologic:   CNII-XII intact, normal strength, normal gait      .    Data Review:  Labs in chart were reviewed.             Imaging Results (All)     Procedure Component Value Units Date/Time    XR Chest AP [[id number]] Collected: 10/15/20 1811     Updated: 10/15/20 1818    " Narrative:      STAT PORTABLE RADIOGRAPHIC VIEW OF THE CHEST      CLINICAL HISTORY: Covid evaluation.     FINDINGS: Stat portable radiographic view of the chest demonstrates  vague areas of increased density within the right lung worrisome for  ground-glass infiltrates. These findings could be confirmed with a CT  scan of the chest, if clinically indicated. The left lung is clear. The  cardiomediastinal silhouette is within normal limits. The osseous  structures are unremarkable.     These findings and recommendations were discussed with Lanie Manuel on  10/15/2020 at approximately 5:49 PM.     This report was finalized on 10/15/2020 6:15 PM by Dr. Norm Barber M.D.               Assessment:    Pneumonia due to COVID-19 virus    Acute respiratory failure with hypoxia (CMS/HCC)    Viral pneumonia    Hypokalemia    Hyponatremia    LFTs abnormal      Plan:    Viral pneumonia secondary to COVID with resulting acute hypoxic respiratory failure as patient currently on 7 L   -Dexamethasone with Pepcid for GI prophylaxis   -ID consultation to determine if qualifies for additional treatment   -Denies any past history of underlying lung disease or cardiac disease but admits to diabetes   -Mildly elevated d-dimer will start on Lovenox at prophylactic dosing    Replace K and check mag with a.m. labs    Hyponatremia is mild and no need for further work-up    Mild elevation of LFTs and will continue to monitor especially if patient is started on Remdesivir    Further recommendations to follow his clinical course unfolds    Alvaro Schroeder MD  10/16/2020  07:24 EDT

## 2020-10-17 ENCOUNTER — APPOINTMENT (OUTPATIENT)
Dept: GENERAL RADIOLOGY | Facility: HOSPITAL | Age: 65
End: 2020-10-17

## 2020-10-17 LAB
ALBUMIN SERPL-MCNC: 3.3 G/DL (ref 3.5–5.2)
ALBUMIN/GLOB SERPL: 1.3 G/DL
ALP SERPL-CCNC: 72 U/L (ref 39–117)
ALT SERPL W P-5'-P-CCNC: 28 U/L (ref 1–41)
ANION GAP SERPL CALCULATED.3IONS-SCNC: 8.4 MMOL/L (ref 5–15)
AST SERPL-CCNC: 33 U/L (ref 1–40)
BH BB BLOOD EXPIRATION DATE: NORMAL
BH BB BLOOD TYPE BARCODE: 6200
BH BB DISPENSE STATUS: NORMAL
BH BB PRODUCT CODE: NORMAL
BH BB UNIT NUMBER: NORMAL
BILIRUB CONJ SERPL-MCNC: <0.2 MG/DL (ref 0–0.3)
BILIRUB SERPL-MCNC: 0.2 MG/DL (ref 0–1.2)
BUN SERPL-MCNC: 17 MG/DL (ref 8–23)
BUN/CREAT SERPL: 20.2 (ref 7–25)
CALCIUM SPEC-SCNC: 8.7 MG/DL (ref 8.6–10.5)
CHLORIDE SERPL-SCNC: 102 MMOL/L (ref 98–107)
CK SERPL-CCNC: 68 U/L (ref 20–200)
CO2 SERPL-SCNC: 26.6 MMOL/L (ref 22–29)
CREAT SERPL-MCNC: 0.84 MG/DL (ref 0.76–1.27)
CRP SERPL-MCNC: 3.22 MG/DL (ref 0–0.5)
D DIMER PPP FEU-MCNC: 0.9 MCGFEU/ML (ref 0–0.49)
GFR SERPL CREATININE-BSD FRML MDRD: 91 ML/MIN/1.73
GLOBULIN UR ELPH-MCNC: 2.6 GM/DL
GLUCOSE SERPL-MCNC: 300 MG/DL (ref 65–99)
LDH SERPL-CCNC: 423 U/L (ref 135–225)
MAGNESIUM SERPL-MCNC: 2.1 MG/DL (ref 1.6–2.4)
POTASSIUM SERPL-SCNC: 3.8 MMOL/L (ref 3.5–5.2)
PROT SERPL-MCNC: 5.9 G/DL (ref 6–8.5)
SODIUM SERPL-SCNC: 137 MMOL/L (ref 136–145)
UNIT  ABO: NORMAL
UNIT  RH: NORMAL

## 2020-10-17 PROCEDURE — 71045 X-RAY EXAM CHEST 1 VIEW: CPT

## 2020-10-17 PROCEDURE — 83735 ASSAY OF MAGNESIUM: CPT | Performed by: HOSPITALIST

## 2020-10-17 PROCEDURE — 94799 UNLISTED PULMONARY SVC/PX: CPT

## 2020-10-17 PROCEDURE — 83615 LACTATE (LD) (LDH) ENZYME: CPT | Performed by: HOSPITALIST

## 2020-10-17 PROCEDURE — 86140 C-REACTIVE PROTEIN: CPT | Performed by: HOSPITALIST

## 2020-10-17 PROCEDURE — 82550 ASSAY OF CK (CPK): CPT | Performed by: HOSPITALIST

## 2020-10-17 PROCEDURE — 82248 BILIRUBIN DIRECT: CPT | Performed by: INTERNAL MEDICINE

## 2020-10-17 PROCEDURE — 63710000001 DEXAMETHASONE PER 0.25 MG: Performed by: HOSPITALIST

## 2020-10-17 PROCEDURE — 80053 COMPREHEN METABOLIC PANEL: CPT | Performed by: HOSPITALIST

## 2020-10-17 PROCEDURE — 85379 FIBRIN DEGRADATION QUANT: CPT | Performed by: HOSPITALIST

## 2020-10-17 PROCEDURE — 25010000002 ENOXAPARIN PER 10 MG: Performed by: HOSPITALIST

## 2020-10-17 RX ORDER — UREA 10 %
3 LOTION (ML) TOPICAL NIGHTLY PRN
Status: DISCONTINUED | OUTPATIENT
Start: 2020-10-17 | End: 2020-10-24 | Stop reason: HOSPADM

## 2020-10-17 RX ORDER — DEXAMETHASONE SODIUM PHOSPHATE 10 MG/ML
6 INJECTION, SOLUTION INTRAMUSCULAR; INTRAVENOUS EVERY 12 HOURS
Status: DISCONTINUED | OUTPATIENT
Start: 2020-10-17 | End: 2020-10-23

## 2020-10-17 RX ADMIN — REMDESIVIR 100 MG: 100 INJECTION, POWDER, LYOPHILIZED, FOR SOLUTION INTRAVENOUS at 13:39

## 2020-10-17 RX ADMIN — AMLODIPINE BESYLATE 10 MG: 10 TABLET ORAL at 08:10

## 2020-10-17 RX ADMIN — FAMOTIDINE 20 MG: 20 TABLET, FILM COATED ORAL at 17:59

## 2020-10-17 RX ADMIN — Medication 3 MG: at 20:32

## 2020-10-17 RX ADMIN — DEXAMETHASONE 6 MG: 4 TABLET ORAL at 08:09

## 2020-10-17 RX ADMIN — FAMOTIDINE 20 MG: 20 TABLET, FILM COATED ORAL at 08:09

## 2020-10-17 RX ADMIN — SODIUM CHLORIDE, PRESERVATIVE FREE 10 ML: 5 INJECTION INTRAVENOUS at 20:32

## 2020-10-17 RX ADMIN — SODIUM CHLORIDE, PRESERVATIVE FREE 10 ML: 5 INJECTION INTRAVENOUS at 08:09

## 2020-10-17 RX ADMIN — TAMSULOSIN HYDROCHLORIDE 0.4 MG: 0.4 CAPSULE ORAL at 20:32

## 2020-10-17 RX ADMIN — LOSARTAN POTASSIUM 100 MG: 100 TABLET, FILM COATED ORAL at 20:32

## 2020-10-17 RX ADMIN — ENOXAPARIN SODIUM 40 MG: 40 INJECTION SUBCUTANEOUS at 08:09

## 2020-10-17 NOTE — PLAN OF CARE
Goal Outcome Evaluation:  Plan of Care Reviewed With: patient  Progress: no change   Patient alert follows commands, no c/o pain or nausea noted. 7 lpm oxygen, tolerating wake and asleep. Plans to titrate during the day. No acute distress noted. Plasma finished at beginning of nightshift. Will continue to monitor

## 2020-10-17 NOTE — PLAN OF CARE
Goal Outcome Evaluation:  Plan of Care Reviewed With: patient  Progress: improving  Outcome Summary: Mild SOA with activity. 02 5L NC to maintain sats > 90%. Remdsivir IV given. Using IS. Telemetry SR.

## 2020-10-18 LAB
ALBUMIN SERPL-MCNC: 3.4 G/DL (ref 3.5–5.2)
ALP SERPL-CCNC: 78 U/L (ref 39–117)
ALT SERPL W P-5'-P-CCNC: 28 U/L (ref 1–41)
AST SERPL-CCNC: 26 U/L (ref 1–40)
BASOPHILS # BLD AUTO: 0.01 10*3/MM3 (ref 0–0.2)
BASOPHILS NFR BLD AUTO: 0.2 % (ref 0–1.5)
BILIRUB CONJ SERPL-MCNC: <0.2 MG/DL (ref 0–0.3)
BILIRUB INDIRECT SERPL-MCNC: ABNORMAL MG/DL
BILIRUB SERPL-MCNC: 0.4 MG/DL (ref 0–1.2)
CREAT SERPL-MCNC: 0.86 MG/DL (ref 0.76–1.27)
CRP SERPL-MCNC: 3.96 MG/DL (ref 0–0.5)
D DIMER PPP FEU-MCNC: 0.66 MCGFEU/ML (ref 0–0.49)
DEPRECATED RDW RBC AUTO: 43 FL (ref 37–54)
EOSINOPHIL # BLD AUTO: 0 10*3/MM3 (ref 0–0.4)
EOSINOPHIL NFR BLD AUTO: 0 % (ref 0.3–6.2)
ERYTHROCYTE [DISTWIDTH] IN BLOOD BY AUTOMATED COUNT: 13.2 % (ref 12.3–15.4)
GFR SERPL CREATININE-BSD FRML MDRD: 89 ML/MIN/1.73
GLUCOSE BLDC GLUCOMTR-MCNC: 474 MG/DL (ref 70–130)
HCT VFR BLD AUTO: 42.2 % (ref 37.5–51)
HGB BLD-MCNC: 14.2 G/DL (ref 13–17.7)
IMM GRANULOCYTES # BLD AUTO: 0.06 10*3/MM3 (ref 0–0.05)
IMM GRANULOCYTES NFR BLD AUTO: 1.1 % (ref 0–0.5)
LYMPHOCYTES # BLD AUTO: 0.92 10*3/MM3 (ref 0.7–3.1)
LYMPHOCYTES NFR BLD AUTO: 16.3 % (ref 19.6–45.3)
MCH RBC QN AUTO: 29.7 PG (ref 26.6–33)
MCHC RBC AUTO-ENTMCNC: 33.6 G/DL (ref 31.5–35.7)
MCV RBC AUTO: 88.3 FL (ref 79–97)
MONOCYTES # BLD AUTO: 0.38 10*3/MM3 (ref 0.1–0.9)
MONOCYTES NFR BLD AUTO: 6.7 % (ref 5–12)
NEUTROPHILS NFR BLD AUTO: 4.27 10*3/MM3 (ref 1.7–7)
NEUTROPHILS NFR BLD AUTO: 75.7 % (ref 42.7–76)
NRBC BLD AUTO-RTO: 0 /100 WBC (ref 0–0.2)
PLATELET # BLD AUTO: 177 10*3/MM3 (ref 140–450)
PMV BLD AUTO: 11.6 FL (ref 6–12)
PROCALCITONIN SERPL-MCNC: <0.02 NG/ML (ref 0–0.25)
PROT SERPL-MCNC: 6.1 G/DL (ref 6–8.5)
RBC # BLD AUTO: 4.78 10*6/MM3 (ref 4.14–5.8)
WBC # BLD AUTO: 5.64 10*3/MM3 (ref 3.4–10.8)

## 2020-10-18 PROCEDURE — 93005 ELECTROCARDIOGRAM TRACING: CPT | Performed by: INTERNAL MEDICINE

## 2020-10-18 PROCEDURE — 80076 HEPATIC FUNCTION PANEL: CPT | Performed by: INTERNAL MEDICINE

## 2020-10-18 PROCEDURE — 25010000002 ENOXAPARIN PER 10 MG: Performed by: HOSPITALIST

## 2020-10-18 PROCEDURE — 99253 IP/OBS CNSLTJ NEW/EST LOW 45: CPT | Performed by: INTERNAL MEDICINE

## 2020-10-18 PROCEDURE — 93010 ELECTROCARDIOGRAM REPORT: CPT | Performed by: INTERNAL MEDICINE

## 2020-10-18 PROCEDURE — 63710000001 INSULIN LISPRO (HUMAN) PER 5 UNITS: Performed by: NURSE PRACTITIONER

## 2020-10-18 PROCEDURE — 82565 ASSAY OF CREATININE: CPT | Performed by: INTERNAL MEDICINE

## 2020-10-18 PROCEDURE — 94799 UNLISTED PULMONARY SVC/PX: CPT

## 2020-10-18 PROCEDURE — 85025 COMPLETE CBC W/AUTO DIFF WBC: CPT | Performed by: INTERNAL MEDICINE

## 2020-10-18 PROCEDURE — 84145 PROCALCITONIN (PCT): CPT | Performed by: INTERNAL MEDICINE

## 2020-10-18 PROCEDURE — 25010000002 DEXAMETHASONE SODIUM PHOSPHATE 10 MG/ML SOLUTION: Performed by: INTERNAL MEDICINE

## 2020-10-18 PROCEDURE — 86140 C-REACTIVE PROTEIN: CPT | Performed by: HOSPITALIST

## 2020-10-18 PROCEDURE — 82962 GLUCOSE BLOOD TEST: CPT

## 2020-10-18 PROCEDURE — 85379 FIBRIN DEGRADATION QUANT: CPT | Performed by: HOSPITALIST

## 2020-10-18 RX ORDER — DEXTROSE MONOHYDRATE 25 G/50ML
25 INJECTION, SOLUTION INTRAVENOUS
Status: DISCONTINUED | OUTPATIENT
Start: 2020-10-18 | End: 2020-10-24 | Stop reason: HOSPADM

## 2020-10-18 RX ORDER — NICOTINE POLACRILEX 4 MG
15 LOZENGE BUCCAL
Status: DISCONTINUED | OUTPATIENT
Start: 2020-10-18 | End: 2020-10-24 | Stop reason: HOSPADM

## 2020-10-18 RX ORDER — AMLODIPINE BESYLATE 5 MG/1
5 TABLET ORAL
Status: DISCONTINUED | OUTPATIENT
Start: 2020-10-19 | End: 2020-10-21

## 2020-10-18 RX ORDER — AMLODIPINE BESYLATE 5 MG/1
5 TABLET ORAL
Status: DISCONTINUED | OUTPATIENT
Start: 2020-10-19 | End: 2020-10-18

## 2020-10-18 RX ORDER — AMLODIPINE BESYLATE 10 MG/1
10 TABLET ORAL
Status: DISCONTINUED | OUTPATIENT
Start: 2020-10-19 | End: 2020-10-18

## 2020-10-18 RX ADMIN — REMDESIVIR 100 MG: 100 INJECTION, POWDER, LYOPHILIZED, FOR SOLUTION INTRAVENOUS at 12:56

## 2020-10-18 RX ADMIN — TAMSULOSIN HYDROCHLORIDE 0.4 MG: 0.4 CAPSULE ORAL at 20:42

## 2020-10-18 RX ADMIN — INSULIN LISPRO 9 UNITS: 100 INJECTION, SOLUTION INTRAVENOUS; SUBCUTANEOUS at 23:21

## 2020-10-18 RX ADMIN — LOSARTAN POTASSIUM 100 MG: 100 TABLET, FILM COATED ORAL at 20:42

## 2020-10-18 RX ADMIN — ENOXAPARIN SODIUM 40 MG: 40 INJECTION SUBCUTANEOUS at 08:35

## 2020-10-18 RX ADMIN — SODIUM CHLORIDE, PRESERVATIVE FREE 10 ML: 5 INJECTION INTRAVENOUS at 20:42

## 2020-10-18 RX ADMIN — AMLODIPINE BESYLATE 10 MG: 10 TABLET ORAL at 08:35

## 2020-10-18 RX ADMIN — DEXAMETHASONE SODIUM PHOSPHATE 6 MG: 10 INJECTION, SOLUTION INTRAMUSCULAR; INTRAVENOUS at 17:28

## 2020-10-18 RX ADMIN — FAMOTIDINE 20 MG: 20 TABLET, FILM COATED ORAL at 17:28

## 2020-10-18 RX ADMIN — DEXAMETHASONE SODIUM PHOSPHATE 6 MG: 10 INJECTION, SOLUTION INTRAMUSCULAR; INTRAVENOUS at 07:04

## 2020-10-18 RX ADMIN — Medication 3 MG: at 20:51

## 2020-10-18 RX ADMIN — SODIUM CHLORIDE, PRESERVATIVE FREE 10 ML: 5 INJECTION INTRAVENOUS at 08:36

## 2020-10-18 RX ADMIN — FAMOTIDINE 20 MG: 20 TABLET, FILM COATED ORAL at 08:34

## 2020-10-18 RX ADMIN — ACETAMINOPHEN 650 MG: 325 TABLET, FILM COATED ORAL at 08:46

## 2020-10-18 NOTE — PROGRESS NOTES
[facility]  [phone]  Jayson Damon MD    Subjective:  LOS: 2    Appears to be doing well after drop in O2 and HR (?) last night. Hx RA but not on rx, states ccp normal and mild symptoms only. Quit smoking 8 yrs ago. Denies n/v/d. HTN only, on other cardiac hx.    Objective   Vital Signs past 24hrs    Temp range: Temp (24hrs), Av.5 °F (36.9 °C), Min:97.8 °F (36.6 °C), Max:99.5 °F (37.5 °C)    BP range: BP: (117-123)/(65-77) 117/77  Pulse range: Heart Rate:  [44-67] 51  Resp rate range: Resp:  [16-21] 21    Device (Oxygen Therapy): heated;high-flow nasal cannulaFlow (L/min):  [5-50] 50  Oxygen range:SpO2:  [88 %-98 %] 98 %      94 kg (207 lb 3.7 oz); Body mass index is 28.9 kg/m².    Intake/Output Summary (Last 24 hours) at 10/18/2020 1033  Last data filed at 10/18/2020 0356  Gross per 24 hour   Intake 360 ml   Output 300 ml   Net 60 ml       Physical Exam  Constitutional:       Appearance: Normal appearance.   HENT:      Head: Normocephalic.      Mouth/Throat:      Mouth: Mucous membranes are moist.   Eyes:      Conjunctiva/sclera: Conjunctivae normal.      Pupils: Pupils are equal, round, and reactive to light.   Cardiovascular:      Rate and Rhythm: Regular rhythm. Bradycardia present.      Heart sounds: Normal heart sounds. No murmur.   Pulmonary:      Breath sounds: Rales (mild Right sided) present.   Abdominal:      General: Bowel sounds are normal. There is no distension.      Palpations: Abdomen is soft. There is no mass.      Tenderness: There is no abdominal tenderness.   Musculoskeletal:         General: No swelling.   Neurological:      Mental Status: He is alert.       Results Review:    I have reviewed the laboratory and imaging data since the last note by Merged with [facility] physician.  My annotations are noted in assessment and plan.    Medication Review:  I have reviewed the current MAR.  My annotations are noted in assessment and plan.    Pharmacy Consult - Remdesivir,       Plan   [facility]  Problems  AHRF  Covid-19 infection  Bilateral infiltrates, R>L  Bradycardia      THESE ARE NEW MEDICAL PROBLEMS TO ME.    Plan of Treatment  On Optiflow but sats are 97%. Wean as tolerated. Looks pretty good. Now on dexa 6 mg iv bid and Remdesivir. D-Dimer not very high.    Unclear why HR dropped to 30's last night. No hx cardiac. Check EKG.    BP lower end, may need to lower losartan dose.    Critically ill but on improving trend.    Jayson Damon MD  10/18/20  10:33 EDT    While in the room and during my examination of the patient I wore gloves, gown, mask, eye protection and followed enhanced droplet/contact isolation protocol and precautions.  I washed my hands before and after this patient encounter.    Communication:  Please USE SECURE CHAT TO MESSAGE ME in EPIC on In-Patient Care of this patient.     Part of this note may be an electronic transcription/translation of spoken language to printed text using the Dragon Dictation System.

## 2020-10-18 NOTE — PLAN OF CARE
Goal Outcome Evaluation:  Plan of Care Reviewed With: patient  Progress: improving   Patient alert follows commands, up adlib with minimal assistance. Oxygen dropped during nightshift. Orders to consult pulm, optio flow oxygen on patient and o2 sats increased. Patient did not report any resp distress at any time during nightshift. Prn melatonin given. No c/o pain or nausea noted. Will continue to monitor

## 2020-10-18 NOTE — CONSULTS
Pulmonary Consultation     Patient Name: Donta Maria  Age/Sex: 65 y.o. male  : 1955  MRN: [id number]    Date of Admission: 10/15/2020  Date of Encounter Visit: 10/17/20  Encounter Provider: Igor Dey MD  Referring Provider: Tereso Nash MD  Place of Service: [facility]  Patient Care Team:  Provider, No Known as PCP - General      Subjective:     Consulted for: Acute hypoxemic respiratory failure with rapid decline    Chief Complaint: Hypoxemia    History of Present Illness:  Donta Maria is a 65 y.o. male with history of diabetes with no significant obesity who was admitted to the hospital on 10/16/2020 was confirmed COVID-19 pneumonia with obvious exposure to several other friends who tested positive for COVID-19.  He had history of respiratory infection-like symptoms for the past week, was treated with a course of amoxicillin as an outpatient, patient denies any history of COPD or tobacco abuse or any chronic lung disease.  He was having some dyspnea on exertion that was progressing to the point where he could not finish usual daily tasks that he presented to the ER, was admitted and started on oral Decadron and on IV remdesivir.  Patient was feeling better and his oxygen requirement was better earlier today however later this evening it started to get worse L relatively alarming the right and he went from 4 L to 8 L with sats still in the 80s, when I was called.  Increasing to 15 L, he had chest x-ray already ordered by the primary team which was reviewed and it showed progression of the interstitial infiltrate.  The patient himself was not having any subjective distress which is not unusual in the COVID-19 pneumonia.  He did not have any leukocytosis, he did not have any fever.  Patient was denied any chest pain, he was actually surprised because he was able to get up and walk and take a shower today without significant distress and he felt like he was getting better.  No altered mental  status.    Pulmonary Functions Testing Results:    No results found for: FEV1, FVC, XHN6ZTR, TLC, DLCO    Review of Systems:   Review of Systems  See history of present illness and past medical history.  Patient admits to subjective fevers and chills but denies night sweats.  Admits to some nausea without emesis though has occasional loose stools.  Denies any neck stiffness or rigidity focal changes to vision smell taste or sound.  Patient will have any issues of chest pain or palpitations but admits to mainly dry cough as well as shortness of breath worse with exertion.  Denies abdominal pain dysuria bruising bleeding focal loss of function denies any tightness or redness to bilateral lower extremities.  Remainder of ROS is negative.    Past Medical History:  Past Medical History:   Diagnosis Date   • Diabetes mellitus (CMS/HCC)    • Hypertension    • Rheumatoid arthritis (CMS/HCC)    • Rheumatoid arthritis (CMS/HCC)        History reviewed. No pertinent surgical history.    Home Medications:   Medications Prior to Admission   Medication Sig Dispense Refill Last Dose   • amLODIPine (NORVASC) 10 MG tablet Take 10 mg by mouth Every Night.      • icosapent ethyl (Vascepa) 1 g capsule capsule Take 2 g by mouth Every Night.      • losartan (COZAAR) 50 MG tablet Take 100 mg by mouth Every Night.      • metFORMIN (GLUCOPHAGE) 1000 MG tablet Take 2,000 mg by mouth Every Night.      • tamsulosin (FLOMAX) 0.4 MG capsule 24 hr capsule Take 1 capsule by mouth Every Night.          Inpatient Medications:  Scheduled Meds:amLODIPine, 10 mg, Oral, Q24H  dexamethasone, 6 mg, Intravenous, Q12H  enoxaparin, 40 mg, Subcutaneous, Q24H  famotidine, 20 mg, Oral, BID   INV [id number] remdesivir in NS IVPB, 100 mg, Intravenous, Q24H  losartan, 100 mg, Oral, Nightly  sodium chloride, 10 mL, Intravenous, Q12H  tamsulosin, 0.4 mg, Oral, Nightly      Continuous Infusions:Pharmacy Consult - Remdesivir,       PRN Meds:.•  acetaminophen **OR**  acetaminophen **OR** acetaminophen  •  melatonin  •  nitroglycerin  •  ondansetron  •  Pharmacy Consult - Remdesivir  •  [COMPLETED] Insert peripheral IV **AND** sodium chloride  •  sodium chloride    Allergies:  Allergies   Allergen Reactions   • Lisinopril Other (See Comments)     Dry cough       Past Social History:  Social History     Socioeconomic History   • Marital status: Single     Spouse name: Not on file   • Number of children: Not on file   • Years of education: Not on file   • Highest education level: Not on file   Tobacco Use   • Smoking status: Former Smoker     Types: Cigarettes     Quit date: 11/15/2008     Years since quittin.9   • Smokeless tobacco: Former User   Substance and Sexual Activity   • Alcohol use: Yes     Comment: rarely   • Sexual activity: Defer       Past Family History:  History reviewed. No pertinent family history.        Objective:   Temp:  [97.8 °F (36.6 °C)-98.8 °F (37.1 °C)] 98.3 °F (36.8 °C)  Heart Rate:  [53-72] 53  Resp:  [18] 18  BP: (120-132)/(65-75) 120/74  SpO2:  [88 %-96 %] 88 %  on  Flow (L/min):  [5-15] 15 Device (Oxygen Therapy): high-flow nasal cannula;humidified     Intake/Output Summary (Last 24 hours) at 10/17/2020 2324  Last data filed at 10/17/2020 1359  Gross per 24 hour   Intake 580 ml   Output 600 ml   Net -20 ml     Body mass index is 28.9 kg/m².      10/15/20  1806 10/15/20  2126   Weight: 102 kg (225 lb) 94 kg (207 lb 3.7 oz)     Weight change:     Physical Exam:   Physical Exam   General:   The patient does not seem to be any distress, but h already on 15 L nasal cannula oxygen, alert oriented x4 pleasant                     Head:    Normocephalic, atraumatic. External ears and nose are normal   Eyes:          Conjunctivae and sclerae normal, no icterus, PERRLA, no  discharge   Throat:   No oral lesions, no thrush, oral mucosa moist.    Neck:   Supple, trachea midline. No JVD, no cervical or supraclavicular lymphadenopathy    Lungs:     Normal  chest on inspection, CTAB, no wheezes. No rhonchi. No crackles. Respirations regular, even and unlabored.      Heart:    Regular rhythm and normal rate.  No murmurs, gallops, or rubs noted.   Abdomen:     Soft, nontender, nondistended, positive bowel sounds. No hepatospleenomegaly    Extremities:   No clubbing, cyanosis, or edema.     Pulses:   Pulses palpable and equal bilaterally.    Skin:   No bleeding or rash. No bumps, good turgor pressure    Neuro:   Nonfocal.  Moves all extremities well. Strength 5/5 and symmetrical, no sensory deficit    Psychiatric:   Normal mood and affect.     Lab Review:   Results from last 7 days   Lab Units 10/17/20  0640 10/16/20  1325 10/15/20  1808   SODIUM mmol/L 137  --  135*   POTASSIUM mmol/L 3.8  --  3.2*   CHLORIDE mmol/L 102  --  99   CO2 mmol/L 26.6  --  24.2   BUN mg/dL 17  --  14   CREATININE mg/dL 0.84 0.84 0.91   GLUCOSE mg/dL 300*  --  276*   CALCIUM mg/dL 8.7  --  9.2   AST (SGOT) U/L 33 37 46*   ALT (SGPT) U/L 28 35 40   ALBUMIN g/dL 3.30* 3.40* 3.90     Results from last 7 days   Lab Units 10/17/20  0640   CK TOTAL U/L 68     Results from last 7 days   Lab Units 10/16/20  0745 10/15/20  1808   WBC 10*3/mm3 3.80 4.87   HEMOGLOBIN g/dL 14.8 15.2   HEMATOCRIT % 42.6 44.4   PLATELETS 10*3/mm3 143 144   MCV fL 87.3 87.4   MCH pg 30.3 29.9   MCHC g/dL 34.7 34.2   RDW % 13.3 13.6   RDW-SD fl 42.3 43.2   MPV fL 11.6 11.4   NEUTROPHIL % %  --  72.9   LYMPHOCYTE % %  --  15.4*   MONOCYTES % %  --  11.1   EOSINOPHIL % %  --  0.0*   BASOPHIL % %  --  0.0   NEUTROS ABS 10*3/mm3  --  3.55   LYMPHS ABS 10*3/mm3  --  0.75   MONOS ABS 10*3/mm3  --  0.54   EOS ABS 10*3/mm3  --  0.00   BASOS ABS 10*3/mm3  --  0.00         Results from last 7 days   Lab Units 10/17/20  0640   MAGNESIUM mg/dL 2.1           Invalid input(s): LDLCALC  Results from last 7 days   Lab Units 10/17/20  0640 10/15/20  1928   D DIMER QUANT MCGFEU/mL 0.90* 0.63*             Results from last 7 days   Lab Units  10/15/20  1808   PROCALCITONIN ng/mL 0.06   LACTATE mmol/L 1.2     Results from last 7 days   Lab Units 10/17/20  0640 10/16/20  1325   CRP mg/dL 3.22* 5.86*         Results from last 7 days   Lab Units 10/15/20  1808   BLOODCX  No growth at 2 days  No growth at 2 days         Results from last 7 days   Lab Units 10/15/20  1911   ADENOVIRUS DETECTION BY PCR  Not Detected   CORONAVIRUS 229E  Not Detected   CORONAVIRUS HKU1  Not Detected   CORONAVIRUS NL63  Not Detected   CORONAVIRUS OC43  Not Detected   HUMAN METAPNEUMOVIRUS  Not Detected   HUMAN RHINOVIRUS/ENTEROVIRUS  Not Detected   INFLUENZA B PCR  Not Detected   PARAINFLUENZA 1  Not Detected   PARAINFLUENZA VIRUS 2  Not Detected   PARAINFLUENZA VIRUS 3  Not Detected   PARAINFLUENZA VIRUS 4  Not Detected   BORDETELLA PERTUSSIS PCR  Not Detected   BORDETELLA PARAPERTUSSIS PCR  Not Detected   [id number]  Not Detected   CHLAMYDOPHILA PNEUMONIAE PCR  Not Detected   MYCOPLAMA PNEUMO PCR  Not Detected   INFLUENZA A H3  Not Detected   INFLUENZA A H1  Not Detected   RSV, PCR  Not Detected             Imaging:  Imaging Results (Most Recent)     Procedure Component Value Units Date/Time    XR Chest 1 View [[id number]] Collected: 10/17/20 2232     Updated: 10/17/20 2239    Narrative:      PORTABLE CHEST RADIOGRAPH     HISTORY: Desaturations. Patient is having positive.     COMPARISON: 10/15/2020     FINDINGS:  Cardiomegaly is present. There is no definite vascular congestion. There  is a suggestion of some hazy opacity within the periphery of the right  lung, which could reflect some worsening infiltrate, and this patient  with a history of COVID. Left lung appears clear. No pneumothorax or  definite pleural effusion is seen.       Impression:      Suggestion of some hazy opacities within the periphery of the right  lung. These may reflect progression of this patient's known COVID.     This report was finalized on 10/17/2020 10:36 PM by Dr. Laura Mg M.D.       XR  Chest AP [[id number]] Collected: 10/15/20 1811     Updated: 10/15/20 1818    Narrative:      STAT PORTABLE RADIOGRAPHIC VIEW OF THE CHEST      CLINICAL HISTORY: Covid evaluation.     FINDINGS: Stat portable radiographic view of the chest demonstrates  vague areas of increased density within the right lung worrisome for  ground-glass infiltrates. These findings could be confirmed with a CT  scan of the chest, if clinically indicated. The left lung is clear. The  cardiomediastinal silhouette is within normal limits. The osseous  structures are unremarkable.     These findings and recommendations were discussed with Lanie Manuel on  10/15/2020 at approximately 5:49 PM.     This report was finalized on 10/15/2020 6:15 PM by Dr. Norm Barber M.D.             I personally viewed and interpreted the patient's imaging studies:  The chest x-ray done today showed evidence of progression of the bilateral interstitial nodular infiltrate    Assessment:   1. Acute hypoxemic respiratory failure with rapid worsening over the course of the past few hours  2. COVID-19 pneumonia resulting in the above  3. Hyponatremia  4. Hypokalemia  5. Mild elevation of liver enzymes secondary to the COVID-19      Plan:     Patient is already on oral Decadron and on IV remdesevir  We will switch Decadron to IV and we will use a twice daily dosing given his rapid progression into a possible ARDS picture  Continue with the other antiviral therapy  We will increase the oxygen flow and switch to OptiFlow, if no better patient would require transfer to the intensive care unit with possible intubation, that would be decided depending on his progress over the next few hours to few days, we will recheck CBC and procalcitonin in a.m.    No evidence so far to suggest any superimposed bacterial infection  We will continue to follow    Thank you for allowing me to participate in the care of Donta Maria. Feel free to contact me directly with any further  questions or concerns.    Igor Dey MD  [facility]   10/17/20  23:24 EDT    Dictated utilizing Dragon dictation

## 2020-10-18 NOTE — PROGRESS NOTES
"    DAILY PROGRESS NOTE  [facility]    Patient Identification:  Name: Donta Maria  Age: 65 y.o.  Sex: male  :  1955  MRN: [id number]         Primary Care Physician: Provider, No Known    Subjective:  Interval History: Overall he is not feeling any worse today though his oxygen requirements did go up and he required use of OptiFlow.  He denies any nausea or vomiting his loose stools are resolved without abdominal pain.  Denies any confusion or complaints of chest pain and he cannot appreciate any palpitations either.  He states he is never had issues with heart rate that is been either low or high and is not on any rate limiting meds either.  He has some truncal obesity but denies any known history of apnea    Objective: No family at bedside.  Patient clinically looks better to me despite elevating oxygen requirements.  His eye contact is poor.  Nontoxic otherwise conversational when questioned    Scheduled Meds:amLODIPine, 10 mg, Oral, Q24H  dexamethasone, 6 mg, Intravenous, Q12H  enoxaparin, 40 mg, Subcutaneous, Q24H  famotidine, 20 mg, Oral, BID   INV [id number] remdesivir in NS IVPB, 100 mg, Intravenous, Q24H  losartan, 100 mg, Oral, Nightly  sodium chloride, 10 mL, Intravenous, Q12H  tamsulosin, 0.4 mg, Oral, Nightly      Continuous Infusions:Pharmacy Consult - Remdesivir,         Vital signs in last 24 hours:  Temp:  [97.8 °F (36.6 °C)-99.5 °F (37.5 °C)] 99.5 °F (37.5 °C)  Heart Rate:  [44-67] 51  Resp:  [16-21] 21  BP: (117-123)/(65-77) 117/77    Intake/Output:    Intake/Output Summary (Last 24 hours) at 10/18/2020 1231  Last data filed at 10/18/2020 0356  Gross per 24 hour   Intake 360 ml   Output 300 ml   Net 60 ml       Exam:  /77 (BP Location: Right arm, Patient Position: Lying)   Pulse 51   Temp 99.5 °F (37.5 °C) (Oral)   Resp 21   Ht 180.3 cm (71\")   Wt 94 kg (207 lb 3.7 oz)   SpO2 98%   BMI 28.90 kg/m²     General Appearance:    Alert, cooperative, AAOx3, nontoxic " appearing                          Head:    Normocephalic, without obvious abnormality, atraumatic                         Throat:   Oral mucosa pink and moist                           Neck:   Supple, symmetrical, trachea midline, no JVD                         Lungs:    Rhonchi noted at right base otherwise clear to auscultation bilaterally, respirations unlabored                 Chest Wall:    No tenderness or deformity                          Heart:    Regular/bradycardia rate and rhythm, S1 and S2 normal, no murmur, no rub or gallop                  Abdomen:     Soft, nontender, bowel sounds active                Extremities: Moving all with appropriate strength, no cyanosis or edema                        Pulses:   Pulses palpable in all extremities                            Skin:   Skin is warm and dry                  Neurologic:   CNII-XII intact, no focal deficits noted     Data Review:  Labs in chart were reviewed.    Assessment:  Active Hospital Problems    Diagnosis  POA   • **Pneumonia due to COVID-19 virus [U07.1, J12.89]  Unknown   • Viral pneumonia [J12.9]  Unknown   • Hypokalemia [E87.6]  Unknown   • Hyponatremia [E87.1]  Unknown   • LFTs abnormal [R94.5]  Unknown   • Acute respiratory failure with hypoxia (CMS/HCC) [J96.01]  Yes      Resolved Hospital Problems   No resolved problems to display.       Plan:    Viral pneumonia secondary to COVID with resulting acute hypoxic respiratory failure as patient formally was on 6-7L escalating to OptiFlow 50 L and is 98%              -Dexamethasone with Pepcid for GI prophylaxis              -Remdesivir/status post CVP               -Improved inflammatory markers    -Curious of pulmonology thinks her could be an element of underlying apnea              -Lovenox for DVT dosing              -Counseled incentive spirometry portance/frequency    Bradycardia also noticed despite normal electrolytes we will get cardiology to see in consultation     Srinivasan MCRAE  and normal mag      Hyponatremia resolved    HTN controlled on losartan doubt hypotension noted     Mild elevation of LFTs and resolved even with addition of Remdesivir and will continue to monitor per protocol     Melatonin as needed for insomnia    Alvaro Schroeder MD  10/18/2020  12:31 EDT

## 2020-10-18 NOTE — NURSING NOTE
Patient's O2 sats dropped to 85% increase oxygen to 9l HF called on call [facility], orders for pulm consult, abg stat, and stat cxr dr mcgowan called back cancelled abg order. Orders to increase oxygen to 15 liters HF. Will continue to monitor.

## 2020-10-18 NOTE — CONSULTS
[facility]  Cardiology Consult Note    Patient Identification:  Name: Donta Maria  Age: 65 y.o.  Sex: male  :  1955  MRN: [id number]             Requesting Physician: Dr. Rollins    Reason for Consultation / Chief Complaint: management recommendations bradycardia    History of Present Illness:   65 years old male with history of diabetes, benign essential arterial hypertension has been admitted with a COVID developed sudden bradycardia last night asymptomatic with a heart rate into the 38, patient did develop falls of 2.7 seconds    Comorbid cardiac risk factors:     Past Medical History:  Past Medical History:   Diagnosis Date   • Diabetes mellitus (CMS/HCC)    • Hypertension    • Rheumatoid arthritis (CMS/HCC)    • Rheumatoid arthritis (CMS/HCC)      Past Surgical History:  History reviewed. No pertinent surgical history.   Allergies:  Allergies   Allergen Reactions   • Lisinopril Other (See Comments)     Dry cough     Home Meds:  Medications Prior to Admission   Medication Sig Dispense Refill Last Dose   • amLODIPine (NORVASC) 10 MG tablet Take 10 mg by mouth Every Night.      • icosapent ethyl (Vascepa) 1 g capsule capsule Take 2 g by mouth Every Night.      • losartan (COZAAR) 50 MG tablet Take 100 mg by mouth Every Night.      • metFORMIN (GLUCOPHAGE) 1000 MG tablet Take 2,000 mg by mouth Every Night.      • tamsulosin (FLOMAX) 0.4 MG capsule 24 hr capsule Take 1 capsule by mouth Every Night.        Current Meds:   [unfilled]  Social History:   Social History     Tobacco Use   • Smoking status: Former Smoker     Types: Cigarettes     Quit date: 11/15/2008     Years since quittin.9   • Smokeless tobacco: Former User   Substance Use Topics   • Alcohol use: Yes     Comment: rarely      Family History:  History reviewed. No pertinent family history.     Review of Systems  CDC with recommendations                                                    Constitutional:  CDC COVID  recommendation              Cardiographics  ECG:         Telemetry:    Echocardiogram:     Imaging  Chest X-ray:     Lab Review   Results from last 7 days   Lab Units 10/17/20  0640   CK TOTAL U/L 68     Results from last 7 days   Lab Units 10/17/20  0640   MAGNESIUM mg/dL 2.1     Results from last 7 days   Lab Units 10/18/20  0945 10/17/20  0640   SODIUM mmol/L  --  137   POTASSIUM mmol/L  --  3.8   BUN mg/dL  --  17   CREATININE mg/dL 0.86 0.84   CALCIUM mg/dL  --  8.7     @LABRCNTIPbnp@  Results from last 7 days   Lab Units 10/18/20  0945 10/16/20  0745 10/15/20  1808   WBC 10*3/mm3 5.64 3.80 4.87   HEMOGLOBIN g/dL 14.2 14.8 15.2   HEMATOCRIT % 42.2 42.6 44.4   PLATELETS 10*3/mm3 177 143 144             Assessment:  Asymptomatic significant bradycardia now back to normal  Pause up to 2.7 seconds  COVID  EKG normal    Recommendations / Plan:   We will repeat EKG and troponins  Considering patient is asymptomatic, will treat conservatively  We will reduce the dose of Norvasc to 5 mg to keep the blood pressure high  Further detailed cardiac work-up when patient medically stable      Nena Jay MD  10/18/2020, 14:08 EDT      EMR Dragon/Transcription:   Dictated utilizing Dragon dictation

## 2020-10-19 PROBLEM — A41.9 SEPSIS DUE TO PNEUMONIA (HCC): Status: ACTIVE | Noted: 2020-10-19

## 2020-10-19 PROBLEM — E11.65 TYPE 2 DIABETES MELLITUS WITH HYPERGLYCEMIA (HCC): Status: ACTIVE | Noted: 2020-10-19

## 2020-10-19 PROBLEM — J18.9 SEPSIS DUE TO PNEUMONIA (HCC): Status: ACTIVE | Noted: 2020-10-19

## 2020-10-19 LAB
ALBUMIN SERPL-MCNC: 3.7 G/DL (ref 3.5–5.2)
ALBUMIN/GLOB SERPL: 1.6 G/DL
ALP SERPL-CCNC: 76 U/L (ref 39–117)
ALT SERPL W P-5'-P-CCNC: 26 U/L (ref 1–41)
ANION GAP SERPL CALCULATED.3IONS-SCNC: 10.5 MMOL/L (ref 5–15)
AST SERPL-CCNC: 24 U/L (ref 1–40)
BILIRUB SERPL-MCNC: 0.4 MG/DL (ref 0–1.2)
BUN SERPL-MCNC: 18 MG/DL (ref 8–23)
BUN/CREAT SERPL: 25 (ref 7–25)
CALCIUM SPEC-SCNC: 9.2 MG/DL (ref 8.6–10.5)
CHLORIDE SERPL-SCNC: 100 MMOL/L (ref 98–107)
CO2 SERPL-SCNC: 29.5 MMOL/L (ref 22–29)
CREAT SERPL-MCNC: 0.72 MG/DL (ref 0.76–1.27)
DEPRECATED RDW RBC AUTO: 42.1 FL (ref 37–54)
ERYTHROCYTE [DISTWIDTH] IN BLOOD BY AUTOMATED COUNT: 13 % (ref 12.3–15.4)
GFR SERPL CREATININE-BSD FRML MDRD: 109 ML/MIN/1.73
GLOBULIN UR ELPH-MCNC: 2.3 GM/DL
GLUCOSE BLDC GLUCOMTR-MCNC: 278 MG/DL (ref 70–130)
GLUCOSE BLDC GLUCOMTR-MCNC: 293 MG/DL (ref 70–130)
GLUCOSE BLDC GLUCOMTR-MCNC: 345 MG/DL (ref 70–130)
GLUCOSE BLDC GLUCOMTR-MCNC: 424 MG/DL (ref 70–130)
GLUCOSE SERPL-MCNC: 308 MG/DL (ref 65–99)
HBA1C MFR BLD: 11.2 % (ref 4.8–5.6)
HCT VFR BLD AUTO: 43.9 % (ref 37.5–51)
HGB BLD-MCNC: 14.6 G/DL (ref 13–17.7)
MCH RBC QN AUTO: 29.5 PG (ref 26.6–33)
MCHC RBC AUTO-ENTMCNC: 33.3 G/DL (ref 31.5–35.7)
MCV RBC AUTO: 88.7 FL (ref 79–97)
PLATELET # BLD AUTO: 198 10*3/MM3 (ref 140–450)
PMV BLD AUTO: 11.6 FL (ref 6–12)
POTASSIUM SERPL-SCNC: 4.4 MMOL/L (ref 3.5–5.2)
PROT SERPL-MCNC: 6 G/DL (ref 6–8.5)
RBC # BLD AUTO: 4.95 10*6/MM3 (ref 4.14–5.8)
SODIUM SERPL-SCNC: 140 MMOL/L (ref 136–145)
TROPONIN T SERPL-MCNC: <0.01 NG/ML (ref 0–0.03)
WBC # BLD AUTO: 5.38 10*3/MM3 (ref 3.4–10.8)

## 2020-10-19 PROCEDURE — 99231 SBSQ HOSP IP/OBS SF/LOW 25: CPT | Performed by: INTERNAL MEDICINE

## 2020-10-19 PROCEDURE — 80053 COMPREHEN METABOLIC PANEL: CPT | Performed by: INTERNAL MEDICINE

## 2020-10-19 PROCEDURE — 63710000001 INSULIN LISPRO (HUMAN) PER 5 UNITS: Performed by: INTERNAL MEDICINE

## 2020-10-19 PROCEDURE — 63710000001 INSULIN GLARGINE PER 5 UNITS: Performed by: INTERNAL MEDICINE

## 2020-10-19 PROCEDURE — 85027 COMPLETE CBC AUTOMATED: CPT | Performed by: INTERNAL MEDICINE

## 2020-10-19 PROCEDURE — 63710000001 INSULIN LISPRO (HUMAN) PER 5 UNITS: Performed by: NURSE PRACTITIONER

## 2020-10-19 PROCEDURE — 94799 UNLISTED PULMONARY SVC/PX: CPT

## 2020-10-19 PROCEDURE — 25010000002 ENOXAPARIN PER 10 MG: Performed by: HOSPITALIST

## 2020-10-19 PROCEDURE — 83036 HEMOGLOBIN GLYCOSYLATED A1C: CPT | Performed by: NURSE PRACTITIONER

## 2020-10-19 PROCEDURE — 25010000002 DEXAMETHASONE SODIUM PHOSPHATE 10 MG/ML SOLUTION: Performed by: INTERNAL MEDICINE

## 2020-10-19 PROCEDURE — 82962 GLUCOSE BLOOD TEST: CPT

## 2020-10-19 PROCEDURE — 84484 ASSAY OF TROPONIN QUANT: CPT | Performed by: INTERNAL MEDICINE

## 2020-10-19 RX ORDER — POTASSIUM CHLORIDE 7.45 MG/ML
10 INJECTION INTRAVENOUS
Status: DISCONTINUED | OUTPATIENT
Start: 2020-10-19 | End: 2020-10-19

## 2020-10-19 RX ORDER — INSULIN GLARGINE 100 [IU]/ML
15 INJECTION, SOLUTION SUBCUTANEOUS NIGHTLY
Status: DISCONTINUED | OUTPATIENT
Start: 2020-10-19 | End: 2020-10-20

## 2020-10-19 RX ORDER — POTASSIUM CHLORIDE 1.5 G/1.77G
40 POWDER, FOR SOLUTION ORAL AS NEEDED
Status: DISCONTINUED | OUTPATIENT
Start: 2020-10-19 | End: 2020-10-19

## 2020-10-19 RX ORDER — POTASSIUM CHLORIDE 750 MG/1
40 TABLET, FILM COATED, EXTENDED RELEASE ORAL AS NEEDED
Status: DISCONTINUED | OUTPATIENT
Start: 2020-10-19 | End: 2020-10-19

## 2020-10-19 RX ADMIN — DEXAMETHASONE SODIUM PHOSPHATE 6 MG: 10 INJECTION, SOLUTION INTRAMUSCULAR; INTRAVENOUS at 05:54

## 2020-10-19 RX ADMIN — INSULIN GLARGINE 15 UNITS: 100 INJECTION, SOLUTION SUBCUTANEOUS at 20:51

## 2020-10-19 RX ADMIN — DEXAMETHASONE SODIUM PHOSPHATE 6 MG: 10 INJECTION, SOLUTION INTRAMUSCULAR; INTRAVENOUS at 17:35

## 2020-10-19 RX ADMIN — Medication 3 MG: at 23:26

## 2020-10-19 RX ADMIN — FAMOTIDINE 20 MG: 20 TABLET, FILM COATED ORAL at 09:29

## 2020-10-19 RX ADMIN — REMDESIVIR 100 MG: 100 INJECTION, POWDER, LYOPHILIZED, FOR SOLUTION INTRAVENOUS at 12:07

## 2020-10-19 RX ADMIN — SODIUM CHLORIDE, PRESERVATIVE FREE 10 ML: 5 INJECTION INTRAVENOUS at 08:55

## 2020-10-19 RX ADMIN — INSULIN LISPRO 6 UNITS: 100 INJECTION, SOLUTION INTRAVENOUS; SUBCUTANEOUS at 08:55

## 2020-10-19 RX ADMIN — INSULIN LISPRO 3 UNITS: 100 INJECTION, SOLUTION INTRAVENOUS; SUBCUTANEOUS at 12:07

## 2020-10-19 RX ADMIN — INSULIN LISPRO 9 UNITS: 100 INJECTION, SOLUTION INTRAVENOUS; SUBCUTANEOUS at 12:07

## 2020-10-19 RX ADMIN — INSULIN LISPRO 16 UNITS: 100 INJECTION, SOLUTION INTRAVENOUS; SUBCUTANEOUS at 17:35

## 2020-10-19 RX ADMIN — ENOXAPARIN SODIUM 40 MG: 40 INJECTION SUBCUTANEOUS at 08:55

## 2020-10-19 RX ADMIN — SODIUM CHLORIDE, PRESERVATIVE FREE 10 ML: 5 INJECTION INTRAVENOUS at 20:51

## 2020-10-19 RX ADMIN — AMLODIPINE BESYLATE 5 MG: 5 TABLET ORAL at 08:55

## 2020-10-19 RX ADMIN — TAMSULOSIN HYDROCHLORIDE 0.4 MG: 0.4 CAPSULE ORAL at 20:51

## 2020-10-19 RX ADMIN — FAMOTIDINE 20 MG: 20 TABLET, FILM COATED ORAL at 17:35

## 2020-10-19 RX ADMIN — LOSARTAN POTASSIUM 100 MG: 100 TABLET, FILM COATED ORAL at 20:51

## 2020-10-19 NOTE — PROGRESS NOTES
[facility]  Cardiology Progress Note    Patient Identification:  Name: Donta Maria  Age: 65 y.o.  Sex: male  :  1955  MRN: [id number]                 Follow Up / Chief Complaint: Bradycardia    Interval History:  Patient admitted with a Covid has developed bradycardia remains asymptomatic     Subjective:  CDC requirement    Objective:    Past Medical History:  Past Medical History:   Diagnosis Date   • Diabetes mellitus (CMS/HCC)    • Hypertension    • Rheumatoid arthritis (CMS/HCC)    • Rheumatoid arthritis (CMS/HCC)      Past Surgical History:  History reviewed. No pertinent surgical history.     Social History:   Social History     Tobacco Use   • Smoking status: Former Smoker     Types: Cigarettes     Quit date: 11/15/2008     Years since quittin.9   • Smokeless tobacco: Former User   Substance Use Topics   • Alcohol use: Yes     Comment: rarely      Family History:  History reviewed. No pertinent family history.       Allergies:  Allergies   Allergen Reactions   • Lisinopril Other (See Comments)     Dry cough     Scheduled Meds:  amLODIPine, 5 mg, Q24H  dexamethasone, 6 mg, Q12H  enoxaparin, 40 mg, Q24H  famotidine, 20 mg, BID AC  insulin lispro, 0-9 Units, 4x Daily With Meals & Nightly  INV [id number] remdesivir in NS IVPB, 100 mg, Q24H  losartan, 100 mg, Nightly  sodium chloride, 10 mL, Q12H  tamsulosin, 0.4 mg, Nightly            INTAKE AND OUTPUT:    Intake/Output Summary (Last 24 hours) at 10/19/2020 4028  Last data filed at 10/19/2020 1400  Gross per 24 hour   Intake --   Output 1500 ml   Net -1500 ml       Review of Systems: CDC requirement  GI:  Cardiac:  Pulmonary:    Constitutional:  Temp:  [96.2 °F (35.7 °C)-98.6 °F (37 °C)] 97.9 °F (36.6 °C)  Heart Rate:  [38-54] 44  Resp:  [16-20] 16  BP: (116-143)/(66-90) 116/66    Physical Exam:  CDC guidelines          Cardiographics  Telemetry:     ECG:     Echocardiogram:     Lab Review   Results from last 7 days   Lab Units  "10/19/20  0834 10/17/20  0640   CK TOTAL U/L  --  68   TROPONIN T ng/mL <0.010  --      Results from last 7 days   Lab Units 10/17/20  0640   MAGNESIUM mg/dL 2.1     Results from last 7 days   Lab Units 10/19/20  0834   SODIUM mmol/L 140   POTASSIUM mmol/L 4.4   BUN mg/dL 18   CREATININE mg/dL 0.72*   CALCIUM mg/dL 9.2     @LABRCNTIPbnp@  Results from last 7 days   Lab Units 10/19/20  0834 10/18/20  0945 10/16/20  0745   WBC 10*3/mm3 5.38 5.64 3.80   HEMOGLOBIN g/dL 14.6 14.2 14.8   HEMATOCRIT % 43.9 42.2 42.6   PLATELETS 10*3/mm3 198 177 143             Assessment:  Bradycardia asymptomatic      Plan:    Continue to treat conservatively further cardiac work-up once the patient is stable      )10/19/2020  MD BRIAN Chinchilla/Transcription:   \"Dictated utilizing Dragon dictation\".     "

## 2020-10-19 NOTE — DISCHARGE PLACEMENT REQUEST
"Donta Maria (65 y.o. Male)     Date of Birth Social Security Number Address Home Phone MRN    1955  [address] [place] [address] [phone] [id number]    [employer] Marital Status          None Single       Admission Date Admission Type Admitting Provider Attending Provider Department, Room/Bed    10/15/20 Emergency [facility] MD Jackson, Alan David, MD [address], [address]    Discharge Date Discharge Disposition Discharge Destination                       Attending Provider: Riley Chang MD    Allergies: Lisinopril    Isolation: Enh Drop/Con   Infection: COVID (confirmed) (10/15/20)   Code Status: CPR    Ht: 180.3 cm (71\")   Wt: 94 kg (207 lb 3.7 oz)    Admission Cmt: None   Principal Problem: Pneumonia due to COVID-19 virus [U07.1,J12.89]                 Active Insurance as of 10/15/2020     Primary Coverage     Payor Plan Insurance Group Employer/Plan Group    [facility] [employer] [id number]     Payor Plan Address Payor Plan Phone Number Payor Plan Fax Number Effective Dates    [address] [phone]  6/25/2019 - None Entered    [facility] [id number]       Subscriber Name Subscriber Birth Date Member ID       DONTA MARIA 1955 [id number]                 Emergency Contacts      (Rel.) Home Phone Work Phone Mobile Phone    Shakira Saldana (Sister) -- -- [phone]              "

## 2020-10-19 NOTE — PROGRESS NOTES
Name: Donta Maria ADMIT: 10/15/2020   : 1955  PCP: Provider, No Known    MRN: [id number] LOS: 3 days   AGE/SEX: 65 y.o. male  ROOM: [facility]   Subjective   Chief Complaint   Patient presents with   • COVID +   • Shortness of Breath     9 to 7L  +dyspnea  On steroids  BG up    ROS  No f/c  No n/v  No cp/palp  +soa/cough    Objective   Vital Signs  Temp:  [96.2 °F (35.7 °C)-98.6 °F (37 °C)] 97.9 °F (36.6 °C)  Heart Rate:  [38-54] 44  Resp:  [16-20] 16  BP: (116-143)/(66-90) 116/66  SpO2:  [92 %-99 %] 96 %  on  Flow (L/min):  [7-40] 7;   Device (Oxygen Therapy): high-flow nasal cannula  Body mass index is 28.9 kg/m².    More limited exam done as pulmonary following    Physical Exam  Constitutional:       Appearance: He is well-developed. He is ill-appearing.   HENT:      Head: Normocephalic and atraumatic.   Eyes:      General: No scleral icterus.  Neck:      Musculoskeletal: Neck supple.   Pulmonary:      Effort: Respiratory distress present.      Breath sounds: No wheezing.   Abdominal:      General: There is no distension.      Palpations: Abdomen is soft.      Tenderness: There is no abdominal tenderness.   Neurological:      Mental Status: He is alert.   Psychiatric:         Behavior: Behavior normal.         Results Review:       I reviewed the patient's new clinical results.  Results from last 7 days   Lab Units 10/19/20  0834 10/18/20  0945 10/16/20  0745 10/15/20  1808   WBC 10*3/mm3 5.38 5.64 3.80 4.87   HEMOGLOBIN g/dL 14.6 14.2 14.8 15.2   PLATELETS 10*3/mm3 198 177 143 144     Results from last 7 days   Lab Units 10/19/20  0834 10/18/20  0945 10/17/20  0640 10/16/20  1325 10/15/20  1808   SODIUM mmol/L 140  --  137  --  135*   POTASSIUM mmol/L 4.4  --  3.8  --  3.2*   CHLORIDE mmol/L 100  --  102  --  99   CO2 mmol/L 29.5*  --  26.6  --  24.2   BUN mg/dL 18  --  17  --  14   CREATININE mg/dL 0.72* 0.86 0.84 0.84 0.91   GLUCOSE mg/dL 308*  --  300*  --  276*   Estimated Creatinine Clearance:  106.4 mL/min (A) (by C-G formula based on SCr of 0.72 mg/dL (L)).  Results from last 7 days   Lab Units 10/19/20  0834 10/18/20  0945 10/17/20  0640 10/16/20  1325   ALBUMIN g/dL 3.70 3.40* 3.30* 3.40*   BILIRUBIN mg/dL 0.4 0.4 0.2 0.3   ALK PHOS U/L 76 78 72 82   AST (SGOT) U/L 24 26 33 37   ALT (SGPT) U/L 26 28 28 35     Results from last 7 days   Lab Units 10/19/20  0834 10/18/20  0945 10/17/20  0640 10/16/20  1325 10/15/20  1808   CALCIUM mg/dL 9.2  --  8.7  --  9.2   ALBUMIN g/dL 3.70 3.40* 3.30* 3.40* 3.90   MAGNESIUM mg/dL  --   --  2.1  --   --      Results from last 7 days   Lab Units 10/18/20  0945 10/15/20  1808   PROCALCITONIN ng/mL <0.02 0.06   LACTATE mmol/L  --  1.2       Coag     HbA1C   Lab Results   Component Value Date    HGBA1C 11.20 (H) 10/19/2020    HGBA1C 8.3 (H) 01/28/2020     Infection   Results from last 7 days   Lab Units 10/18/20  0945 10/15/20  1808   BLOODCX   --  No growth at 3 days  No growth at 3 days   PROCALCITONIN ng/mL <0.02 0.06     Radiology(recent) Xr Chest 1 View    Result Date: 10/17/2020  Suggestion of some hazy opacities within the periphery of the right lung. These may reflect progression of this patient's known COVID.  This report was finalized on 10/17/2020 10:36 PM by Dr. Laura Mg M.D.      Troponin T   Date Value Ref Range Status   10/19/2020 <0.010 0.000 - 0.030 ng/mL Final     No components found for: TSH;2    amLODIPine, 5 mg, Oral, Q24H  dexamethasone, 6 mg, Intravenous, Q12H  enoxaparin, 40 mg, Subcutaneous, Q24H  famotidine, 20 mg, Oral, BID AC  insulin glargine, 15 Units, Subcutaneous, Nightly  insulin lispro, 0-24 Units, Subcutaneous, TID AC  INV [id number] remdesivir in NS IVPB, 100 mg, Intravenous, Q24H  losartan, 100 mg, Oral, Nightly  sodium chloride, 10 mL, Intravenous, Q12H  tamsulosin, 0.4 mg, Oral, Nightly      Pharmacy Consult - Remdesivir,     Diet Regular; Consistent Carbohydrate      Assessment/Plan      Active Hospital Problems    Diagnosis   POA   • **Pneumonia due to COVID-19 virus [U07.1, J12.89]  Yes   • Type 2 diabetes mellitus with hyperglycemia (CMS/[facility]) [E11.65]  Yes   • Sepsis due to pneumonia (CMS/[facility]) [J18.9, A41.9]  Yes   • Viral pneumonia [J12.9]  Yes   • Hypokalemia [E87.6]  Yes   • Hyponatremia [E87.1]  Yes   • LFTs abnormal [R94.5]  Yes   • Acute respiratory failure with hypoxia (CMS/[facility]) [J96.01]  Yes      Resolved Hospital Problems   No resolved problems to display.       · Supportive care with dexamethasone, remdesivir  · High oxygen requirements, monitor closely  · Add lantus, increase SSI  · Monitor labs  · lovenox for dvt proph  · Asymptomatic bradycardia    Thanks to specialists who are following    ROBINSON RN  Reviewed records    Riley Chang MD  [facility]  10/19/20  10:43 EDT

## 2020-10-19 NOTE — PLAN OF CARE
Goal Outcome Evaluation:  Plan of Care Reviewed With: patient  Progress: improving   Patient alert follows commands. 15 liters high flow at this time, O2 sats are staying above 90%. No resp distress noted. VSS. No c/o pain or nausea noted. No acute distress noted will continue to monitor

## 2020-10-19 NOTE — PLAN OF CARE
Goal Outcome Evaluation:  Plan of Care Reviewed With: patient  Progress: improving  Outcome Summary: Pt Denies SOA.  7lpm on hi flow sats 94%.  BG elevated and treated with insulin.  Remdesivir given without issue.  VSS.  Bradycardia cont.  Will cont to monitor.

## 2020-10-19 NOTE — PROGRESS NOTES
[facility]  [phone]  Jayson Damon MD    Subjective:  LOS: 3    Continued low heart rate.  Requiring 10-12 L high flow at the moment.  However no distress.  Denies cough or phlegm and looks comfortable.  Looks generally better.    Objective   Vital Signs past 24hrs    Temp range: Temp (24hrs), Av.6 °F (36.4 °C), Min:96.2 °F (35.7 °C), Max:98.6 °F (37 °C)    BP range: BP: (111-143)/(60-90) 137/83  Pulse range: Heart Rate:  [38-60] 53  Resp rate range: Resp:  [16-20] 20    Device (Oxygen Therapy): high-flow nasal cannulaFlow (L/min):  [15-50] 15  Oxygen range:SpO2:  [91 %-99 %] 93 %      94 kg (207 lb 3.7 oz); Body mass index is 28.9 kg/m².    Intake/Output Summary (Last 24 hours) at 10/19/2020 1207  Last data filed at 10/18/2020 2019  Gross per 24 hour   Intake 120 ml   Output 1500 ml   Net -1380 ml       Physical Exam  Constitutional:       Appearance: Normal appearance.   HENT:      Head: Normocephalic.      Mouth/Throat:      Mouth: Mucous membranes are moist.   Eyes:      Conjunctiva/sclera: Conjunctivae normal.      Pupils: Pupils are equal, round, and reactive to light.   Cardiovascular:      Rate and Rhythm: Regular rhythm. Bradycardia present.      Heart sounds: Normal heart sounds. No murmur.   Pulmonary:      Breath sounds: Examination of the right-lower field reveals rales. Examination of the left-lower field reveals rales. Rales present.   Abdominal:      General: Bowel sounds are normal. There is no distension.      Palpations: Abdomen is soft. There is no mass.      Tenderness: There is no abdominal tenderness.   Musculoskeletal:         General: No swelling.   Neurological:      Mental Status: He is alert.       Results Review:    I have reviewed the laboratory and imaging data since the last note by [facility] physician.  My annotations are noted in assessment and plan.    Medication Review:  I have reviewed the current MAR.  My annotations are noted in assessment and plan.    Pharmacy  Consult - Remdesivir,       Plan   PCCM Problems  AHRF  Covid-19 infection  Bilateral infiltrates, R>L  Bradycardia        Plan of Treatment  Ongoing supportive treatment for COVID-19 infection.  Now on dexa 6 mg iv bid and Remdesivir. D-Dimer not very high.  Currently on high flow cannula.  Continue to wean oxygen.    Note cardiology following for bradycardia.    Critically ill but on improving trend.    Jayson Damon MD  10/19/20  12:07 EDT    While in the room and during my examination of the patient I wore gloves, gown, mask, eye protection and followed enhanced droplet/contact isolation protocol and precautions.  I washed my hands before and after this patient encounter.    Communication:  Please USE SECURE CHAT TO MESSAGE ME in EPIC on In-Patient Care of this patient.     Part of this note may be an electronic transcription/translation of spoken language to printed text using the Dragon Dictation System.

## 2020-10-20 LAB
ALBUMIN SERPL-MCNC: 3.3 G/DL (ref 3.5–5.2)
ALP SERPL-CCNC: 69 U/L (ref 39–117)
ALT SERPL W P-5'-P-CCNC: 23 U/L (ref 1–41)
ANION GAP SERPL CALCULATED.3IONS-SCNC: 10.4 MMOL/L (ref 5–15)
AST SERPL-CCNC: 24 U/L (ref 1–40)
BACTERIA SPEC AEROBE CULT: NORMAL
BACTERIA SPEC AEROBE CULT: NORMAL
BILIRUB CONJ SERPL-MCNC: <0.2 MG/DL (ref 0–0.3)
BILIRUB INDIRECT SERPL-MCNC: ABNORMAL MG/DL
BILIRUB SERPL-MCNC: 0.3 MG/DL (ref 0–1.2)
BUN SERPL-MCNC: 18 MG/DL (ref 8–23)
BUN/CREAT SERPL: 21.4 (ref 7–25)
CALCIUM SPEC-SCNC: 8.7 MG/DL (ref 8.6–10.5)
CHLORIDE SERPL-SCNC: 101 MMOL/L (ref 98–107)
CO2 SERPL-SCNC: 27.6 MMOL/L (ref 22–29)
CREAT SERPL-MCNC: 0.84 MG/DL (ref 0.76–1.27)
GFR SERPL CREATININE-BSD FRML MDRD: 91 ML/MIN/1.73
GLUCOSE BLDC GLUCOMTR-MCNC: 295 MG/DL (ref 70–130)
GLUCOSE BLDC GLUCOMTR-MCNC: 350 MG/DL (ref 70–130)
GLUCOSE BLDC GLUCOMTR-MCNC: 360 MG/DL (ref 70–130)
GLUCOSE BLDC GLUCOMTR-MCNC: 407 MG/DL (ref 70–130)
GLUCOSE SERPL-MCNC: 292 MG/DL (ref 65–99)
POTASSIUM SERPL-SCNC: 4.2 MMOL/L (ref 3.5–5.2)
PROT SERPL-MCNC: 5.5 G/DL (ref 6–8.5)
SODIUM SERPL-SCNC: 139 MMOL/L (ref 136–145)

## 2020-10-20 PROCEDURE — 25010000002 DEXAMETHASONE SODIUM PHOSPHATE 10 MG/ML SOLUTION: Performed by: INTERNAL MEDICINE

## 2020-10-20 PROCEDURE — 94799 UNLISTED PULMONARY SVC/PX: CPT

## 2020-10-20 PROCEDURE — 25010000002 ENOXAPARIN PER 10 MG: Performed by: HOSPITALIST

## 2020-10-20 PROCEDURE — 80076 HEPATIC FUNCTION PANEL: CPT | Performed by: INTERNAL MEDICINE

## 2020-10-20 PROCEDURE — 63710000001 INSULIN LISPRO (HUMAN) PER 5 UNITS: Performed by: INTERNAL MEDICINE

## 2020-10-20 PROCEDURE — 80048 BASIC METABOLIC PNL TOTAL CA: CPT | Performed by: INTERNAL MEDICINE

## 2020-10-20 PROCEDURE — 63710000001 INSULIN GLARGINE PER 5 UNITS: Performed by: INTERNAL MEDICINE

## 2020-10-20 PROCEDURE — 99232 SBSQ HOSP IP/OBS MODERATE 35: CPT | Performed by: INTERNAL MEDICINE

## 2020-10-20 PROCEDURE — 82962 GLUCOSE BLOOD TEST: CPT

## 2020-10-20 RX ORDER — INSULIN GLARGINE 100 [IU]/ML
20 INJECTION, SOLUTION SUBCUTANEOUS NIGHTLY
Status: DISCONTINUED | OUTPATIENT
Start: 2020-10-20 | End: 2020-10-21

## 2020-10-20 RX ADMIN — INSULIN LISPRO 20 UNITS: 100 INJECTION, SOLUTION INTRAVENOUS; SUBCUTANEOUS at 12:52

## 2020-10-20 RX ADMIN — INSULIN LISPRO 24 UNITS: 100 INJECTION, SOLUTION INTRAVENOUS; SUBCUTANEOUS at 17:37

## 2020-10-20 RX ADMIN — INSULIN LISPRO 12 UNITS: 100 INJECTION, SOLUTION INTRAVENOUS; SUBCUTANEOUS at 07:03

## 2020-10-20 RX ADMIN — FAMOTIDINE 20 MG: 20 TABLET, FILM COATED ORAL at 17:38

## 2020-10-20 RX ADMIN — ENOXAPARIN SODIUM 40 MG: 40 INJECTION SUBCUTANEOUS at 08:14

## 2020-10-20 RX ADMIN — DEXAMETHASONE SODIUM PHOSPHATE 6 MG: 10 INJECTION, SOLUTION INTRAMUSCULAR; INTRAVENOUS at 12:56

## 2020-10-20 RX ADMIN — REMDESIVIR 100 MG: 100 INJECTION, POWDER, LYOPHILIZED, FOR SOLUTION INTRAVENOUS at 13:09

## 2020-10-20 RX ADMIN — SODIUM CHLORIDE, PRESERVATIVE FREE 10 ML: 5 INJECTION INTRAVENOUS at 21:30

## 2020-10-20 RX ADMIN — INSULIN GLARGINE 20 UNITS: 100 INJECTION, SOLUTION SUBCUTANEOUS at 21:31

## 2020-10-20 RX ADMIN — AMLODIPINE BESYLATE 5 MG: 5 TABLET ORAL at 08:13

## 2020-10-20 RX ADMIN — TAMSULOSIN HYDROCHLORIDE 0.4 MG: 0.4 CAPSULE ORAL at 21:27

## 2020-10-20 RX ADMIN — FAMOTIDINE 20 MG: 20 TABLET, FILM COATED ORAL at 07:03

## 2020-10-20 RX ADMIN — SODIUM CHLORIDE, PRESERVATIVE FREE 10 ML: 5 INJECTION INTRAVENOUS at 08:26

## 2020-10-20 NOTE — PLAN OF CARE
Goal Outcome Evaluation:  Plan of Care Reviewed With: patient  Progress: improving   VSS, up ad pedro,continue IV remdesivire and dexamethazone. Monitor BS administer insulin per protocol. TTE ordered. O2 at 6L per High Flow NC. Enc use of incentive spirometer. Monitor telemetry per Bradycardia and pauses. Will continue to monitor.

## 2020-10-20 NOTE — PROGRESS NOTES
Name: Donta Maria ADMIT: 10/15/2020   : 1955  PCP: Nena Maxwell MD    MRN: [id number] LOS: 4 days   AGE/SEX: 65 y.o. male  ROOM: [facility]   Subjective   Chief Complaint   Patient presents with   • COVID +   • Shortness of Breath     7 to 6L oxygen  +dyspnea  On steroids  BG very high  On insulin    ROS  No f/c  No n/v  No cp/palp  +soa/cough    Objective   Vital Signs  Temp:  [97.1 °F (36.2 °C)] 97.1 °F (36.2 °C)  Heart Rate:  [44-54] 54  Resp:  [18] 18  BP: (126-136)/(79-86) 136/86  SpO2:  [90 %-93 %] 90 %  on  Flow (L/min):  [6-7] 6;   Device (Oxygen Therapy): humidified;high-flow nasal cannula  Body mass index is 28.9 kg/m².    More limited exam done as pulmonary following    Physical Exam  Constitutional:       Appearance: He is well-developed. He is ill-appearing.   HENT:      Head: Normocephalic and atraumatic.   Eyes:      General: No scleral icterus.  Neck:      Musculoskeletal: Neck supple.   Pulmonary:      Effort: Respiratory distress present.      Breath sounds: No wheezing.   Abdominal:      General: There is no distension.      Palpations: Abdomen is soft.      Tenderness: There is no abdominal tenderness.   Neurological:      Mental Status: He is alert.   Psychiatric:         Behavior: Behavior normal.         Results Review:       I reviewed the patient's new clinical results.  Results from last 7 days   Lab Units 10/19/20  0834 10/18/20  0945 10/16/20  0745 10/15/20  1808   WBC 10*3/mm3 5.38 5.64 3.80 4.87   HEMOGLOBIN g/dL 14.6 14.2 14.8 15.2   PLATELETS 10*3/mm3 198 177 143 144     Results from last 7 days   Lab Units 10/20/20  0742 10/19/20  0834 10/18/20  0945 10/17/20  0640  10/15/20  1808   SODIUM mmol/L 139 140  --  137  --  135*   POTASSIUM mmol/L 4.2 4.4  --  3.8  --  3.2*   CHLORIDE mmol/L 101 100  --  102  --  99   CO2 mmol/L 27.6 29.5*  --  26.6  --  24.2   BUN mg/dL 18 18  --  17  --  14   CREATININE mg/dL 0.84 0.72* 0.86 0.84   < > 0.91   GLUCOSE mg/dL 292* 308*  --   300*  --  276*    < > = values in this interval not displayed.   Estimated Creatinine Clearance: 101.3 mL/min (by C-G formula based on SCr of 0.84 mg/dL).  Results from last 7 days   Lab Units 10/20/20  0742 10/19/20  0834 10/18/20  0945 10/17/20  0640   ALBUMIN g/dL 3.30* 3.70 3.40* 3.30*   BILIRUBIN mg/dL 0.3 0.4 0.4 0.2   ALK PHOS U/L 69 76 78 72   AST (SGOT) U/L 24 24 26 33   ALT (SGPT) U/L 23 26 28 28     Results from last 7 days   Lab Units 10/20/20  0742 10/19/20  0834 10/18/20  0945 10/17/20  0640  10/15/20  1808   CALCIUM mg/dL 8.7 9.2  --  8.7  --  9.2   ALBUMIN g/dL 3.30* 3.70 3.40* 3.30*   < > 3.90   MAGNESIUM mg/dL  --   --   --  2.1  --   --     < > = values in this interval not displayed.     Results from last 7 days   Lab Units 10/18/20  0945 10/15/20  1808   PROCALCITONIN ng/mL <0.02 0.06   LACTATE mmol/L  --  1.2       Coag     HbA1C   Lab Results   Component Value Date    HGBA1C 11.20 (H) 10/19/2020    HGBA1C 8.3 (H) 01/28/2020     Infection   Results from last 7 days   Lab Units 10/18/20  0945 10/15/20  1808   BLOODCX   --  No growth at 4 days  No growth at 4 days   PROCALCITONIN ng/mL <0.02 0.06     Radiology(recent) No radiology results for the last day  Troponin T   Date Value Ref Range Status   10/19/2020 <0.010 0.000 - 0.030 ng/mL Final     No components found for: TSH;2    amLODIPine, 5 mg, Oral, Q24H  dexamethasone, 6 mg, Intravenous, Q12H  enoxaparin, 40 mg, Subcutaneous, Q24H  famotidine, 20 mg, Oral, BID AC  insulin glargine, 20 Units, Subcutaneous, Nightly  insulin lispro, 0-24 Units, Subcutaneous, TID AC  losartan, 100 mg, Oral, Nightly  sodium chloride, 10 mL, Intravenous, Q12H  tamsulosin, 0.4 mg, Oral, Nightly      Pharmacy Consult - Remdesivir,     Diet Regular; Consistent Carbohydrate      Assessment/Plan      Active Hospital Problems    Diagnosis  POA   • **Pneumonia due to COVID-19 virus [U07.1, J12.89]  Yes   • Type 2 diabetes mellitus with hyperglycemia (CMS/[facility]) [E11.65]   Yes   • Sepsis due to pneumonia (CMS/[facility]) [J18.9, A41.9]  Yes   • Viral pneumonia [J12.9]  Yes   • Hypokalemia [E87.6]  Yes   • Hyponatremia [E87.1]  Yes   • LFTs abnormal [R94.5]  Yes   • Acute respiratory failure with hypoxia (CMS/[facility]) [J96.01]  Yes      Resolved Hospital Problems   No resolved problems to display.       · Supportive care with dexamethasone, remdesivir  · High oxygen requirements, monitor closely. A little better over the past 2 days  · Increase lantus, increase SSI, monitor BG closely  · Monitor labs  · lovenox for dvt proph  · Asymptomatic bradycardia    Thanks to specialists who are following    ROBINSON RN  Reviewed records    Riley Chang MD  [facility]  10/20/20  10:43 EDT

## 2020-10-20 NOTE — PROGRESS NOTES
[facility]  [phone]  Jayson Damon MD    Subjective:  LOS: 4    Feels very weak and tired. Steroids not allowing him to sleep. Overall resp better and denies sig cough or phlegm.    Objective   Vital Signs past 24hrs    Temp range: Temp (24hrs), Av.4 °F (36.3 °C), Min:97.1 °F (36.2 °C), Max:97.9 °F (36.6 °C)    BP range: BP: (116-136)/(66-86) 136/86  Pulse range: Heart Rate:  [44-53] 51  Resp rate range: Resp:  [16-18] 18    Device (Oxygen Therapy): high-flow nasal cannulaFlow (L/min):  [6-7] 6  Oxygen range:SpO2:  [92 %-96 %] 92 %      94 kg (207 lb 3.7 oz); Body mass index is 28.9 kg/m².    Intake/Output Summary (Last 24 hours) at 10/20/2020 1103  Last data filed at 10/19/2020 1400  Gross per 24 hour   Intake --   Output 500 ml   Net -500 ml       Physical Exam  Constitutional:       Appearance: Normal appearance.   HENT:      Head: Normocephalic.      Mouth/Throat:      Mouth: Mucous membranes are moist.   Eyes:      Conjunctiva/sclera: Conjunctivae normal.      Pupils: Pupils are equal, round, and reactive to light.   Cardiovascular:      Rate and Rhythm: Regular rhythm. Bradycardia present.      Heart sounds: Normal heart sounds. No murmur.   Pulmonary:      Breath sounds: Examination of the right-lower field reveals rales. Examination of the left-lower field reveals rales. Rales present.   Abdominal:      General: Bowel sounds are normal. There is no distension.      Palpations: Abdomen is soft. There is no mass.      Tenderness: There is no abdominal tenderness.   Musculoskeletal:         General: No swelling.   Neurological:      Mental Status: He is alert.       Results Review:    I have reviewed the laboratory and imaging data since the last note by [facility] physician.  My annotations are noted in assessment and plan.    Medication Review:  I have reviewed the current MAR.  My annotations are noted in assessment and plan.    Pharmacy Consult - Remdesivir,       Plan   [facility]  Problems  AHRF  Covid-19 infection  Bilateral infiltrates, R>L  Bradycardia        Plan of Treatment  Ongoing supportive treatment for COVID-19 infection.  Now on dexa 6 mg iv bid and finishing Remdesivir. Currently on high flow cannula.  Continue to wean oxygen. Appears better.    Note cardiology following for bradycardia.    Critically ill but on improving trend.    Jayson Damon MD  10/20/20  11:03 EDT    While in the room and during my examination of the patient I wore gloves, gown, mask, eye protection and followed enhanced droplet/contact isolation protocol and precautions.  I washed my hands before and after this patient encounter.    Communication:  Please USE SECURE CHAT TO MESSAGE ME in EPIC on In-Patient Care of this patient.     Part of this note may be an electronic transcription/translation of spoken language to printed text using the Dragon Dictation System.

## 2020-10-20 NOTE — PROGRESS NOTES
[facility]  Cardiology Progress Note    Patient Identification:  Name: Donta Maria  Age: 65 y.o.  Sex: male  :  1955  MRN: [id number]                 Follow Up / Chief Complaint: Bradycardia    Interval History: Asymptomatic for bradycardia     Subjective: CDC requirement       Objective:    Past Medical History:  Past Medical History:   Diagnosis Date   • Diabetes mellitus (CMS/HCC)    • Hypertension    • Rheumatoid arthritis (CMS/HCC)    • Rheumatoid arthritis (CMS/HCC)      Past Surgical History:  History reviewed. No pertinent surgical history.     Social History:   Social History     Tobacco Use   • Smoking status: Former Smoker     Types: Cigarettes     Quit date: 11/15/2008     Years since quittin.9   • Smokeless tobacco: Former User   Substance Use Topics   • Alcohol use: Yes     Comment: rarely      Family History:  History reviewed. No pertinent family history.       Allergies:  Allergies   Allergen Reactions   • Lisinopril Other (See Comments)     Dry cough     Scheduled Meds:  amLODIPine, 5 mg, Q24H  dexamethasone, 6 mg, Q12H  enoxaparin, 40 mg, Q24H  famotidine, 20 mg, BID AC  insulin glargine, 20 Units, Nightly  insulin lispro, 0-24 Units, TID AC  losartan, 100 mg, Nightly  sodium chloride, 10 mL, Q12H  tamsulosin, 0.4 mg, Nightly            INTAKE AND OUTPUT:  No intake or output data in the 24 hours ending 10/20/20 1625    Review of Systems: See requirement GI:  Cardiac:  Pulmonary:    Constitutional:  Temp:  [97.1 °F (36.2 °C)-97.7 °F (36.5 °C)] 97.7 °F (36.5 °C)  Heart Rate:  [44-56] 56  Resp:  [14-18] 14  BP: (126-136)/(79-86) 131/80    Physical Exam:  CDC guidelines          Cardiographics  Telemetry:         ECG:          Lab Review   Results from last 7 days   Lab Units 10/19/20  0834 10/17/20  0640   CK TOTAL U/L  --  68   TROPONIN T ng/mL <0.010  --      Results from last 7 days   Lab Units 10/17/20  0640   MAGNESIUM mg/dL 2.1     Results from last 7 days   Lab  "Units 10/20/20  0742   SODIUM mmol/L 139   POTASSIUM mmol/L 4.2   BUN mg/dL 18   CREATININE mg/dL 0.84   CALCIUM mg/dL 8.7        Results from last 7 days   Lab Units 10/19/20  0834 10/18/20  0945 10/16/20  0745   WBC 10*3/mm3 5.38 5.64 3.80   HEMOGLOBIN g/dL 14.6 14.2 14.8   HEMATOCRIT % 43.9 42.2 42.6   PLATELETS 10*3/mm3 198 177 143         The following medical decision was discussed in detail with Dr. Jay    Assessment:  Bradycardia asymptomatic  Pause noted 4.0, as above  Acute respiratory failure with hypoxia viral pneumonia pneumonia due to COVID-19 virus  Hypokalemia   hyponatremia  Elevated LFTs  diabetes mellitus   sepsis due to pneumonia        Plan:  Patient has significant pauses longest was 3.9 seconds, will continue to treat conservatively no temporary pacemaker  Discontinue Nitish Jay MD        )10/20/2020       EMR Dragon/Transcription:   \"Dictated utilizing Dragon dictation\".     "

## 2020-10-20 NOTE — PROGRESS NOTES
Discharge Planning Assessment  [facility]     Patient Name: Donta Maria  MRN: [id number]  Today's Date: 10/20/2020    Admit Date: 10/15/2020    Discharge Needs Assessment     Row Name 10/20/20 1338       Living Environment    Current Living Arrangements  home/apartment/condo    Primary Care Provided by  self       Resource/Environmental Concerns    Resource/Environmental Concerns  none       Transition Planning    Patient/Family Anticipates Transition to  home    Patient/Family Anticipated Services at Transition  home health care       Discharge Needs Assessment    Equipment Currently Used at Home  none    Concerns to be Addressed  adjustment to diagnosis/illness    Equipment Needed After Discharge  respiratory supplies        Discharge Plan     Row Name 10/20/20 1339       Plan    Plan  Home w/ [facility] for Home O2    Plan Comments  Spoke w/ pt by phone, facesheet verified (updated facesheet w/ PCP Dr. Maxwell). Pt plans to return home once he is medically stable. He is agreeable w/ [facility] for any HH/O2 needs. [facility] and [facility] following. CCP will follow progress.        Continued Care and Services - Admitted Since 10/15/2020     Durable Medical Equipment     Service Provider Request Status Selected Services Address Phone Fax    [facility] [place]  Pending - Request Sent N/A [address], [facility] [address] [phone]          Home Medical Care Coordination complete    Service Provider Request Status Selected Services Address Phone Fax    [facility] [place]   Selected Home Health Services [address] [id number] [phone]                Demographic Summary    No documentation.       Functional Status    No documentation.       Psychosocial    No documentation.       Abuse/Neglect    No documentation.       Legal    No documentation.       Substance Abuse    No documentation.       Patient Forms       No documentation.           Lola Holbrook RN

## 2020-10-21 ENCOUNTER — APPOINTMENT (OUTPATIENT)
Dept: CARDIOLOGY | Facility: HOSPITAL | Age: 65
End: 2020-10-21

## 2020-10-21 LAB
GLUCOSE BLDC GLUCOMTR-MCNC: 267 MG/DL (ref 70–130)
GLUCOSE BLDC GLUCOMTR-MCNC: 298 MG/DL (ref 70–130)
GLUCOSE BLDC GLUCOMTR-MCNC: 317 MG/DL (ref 70–130)
GLUCOSE BLDC GLUCOMTR-MCNC: 384 MG/DL (ref 70–130)
MAXIMAL PREDICTED HEART RATE: 154 BPM
STRESS TARGET HR: 131 BPM

## 2020-10-21 PROCEDURE — 94799 UNLISTED PULMONARY SVC/PX: CPT

## 2020-10-21 PROCEDURE — 25010000002 ENOXAPARIN PER 10 MG: Performed by: HOSPITALIST

## 2020-10-21 PROCEDURE — 93308 TTE F-UP OR LMTD: CPT

## 2020-10-21 PROCEDURE — 63710000001 INSULIN LISPRO (HUMAN) PER 5 UNITS: Performed by: INTERNAL MEDICINE

## 2020-10-21 PROCEDURE — 82962 GLUCOSE BLOOD TEST: CPT

## 2020-10-21 PROCEDURE — 63710000001 INSULIN GLARGINE PER 5 UNITS: Performed by: INTERNAL MEDICINE

## 2020-10-21 PROCEDURE — 93325 DOPPLER ECHO COLOR FLOW MAPG: CPT | Performed by: INTERNAL MEDICINE

## 2020-10-21 PROCEDURE — 93325 DOPPLER ECHO COLOR FLOW MAPG: CPT

## 2020-10-21 PROCEDURE — 25010000002 DEXAMETHASONE SODIUM PHOSPHATE 10 MG/ML SOLUTION: Performed by: INTERNAL MEDICINE

## 2020-10-21 PROCEDURE — 93308 TTE F-UP OR LMTD: CPT | Performed by: INTERNAL MEDICINE

## 2020-10-21 PROCEDURE — 25010000002 PERFLUTREN (DEFINITY) 8.476 MG IN SODIUM CHLORIDE 0.9 % 10 ML INJECTION: Performed by: INTERNAL MEDICINE

## 2020-10-21 PROCEDURE — 99231 SBSQ HOSP IP/OBS SF/LOW 25: CPT | Performed by: INTERNAL MEDICINE

## 2020-10-21 RX ORDER — INSULIN GLARGINE 100 [IU]/ML
24 INJECTION, SOLUTION SUBCUTANEOUS NIGHTLY
Status: DISCONTINUED | OUTPATIENT
Start: 2020-10-21 | End: 2020-10-22

## 2020-10-21 RX ADMIN — FAMOTIDINE 20 MG: 20 TABLET, FILM COATED ORAL at 06:37

## 2020-10-21 RX ADMIN — INSULIN LISPRO 12 UNITS: 100 INJECTION, SOLUTION INTRAVENOUS; SUBCUTANEOUS at 12:19

## 2020-10-21 RX ADMIN — DEXAMETHASONE SODIUM PHOSPHATE 6 MG: 10 INJECTION, SOLUTION INTRAMUSCULAR; INTRAVENOUS at 12:20

## 2020-10-21 RX ADMIN — DEXAMETHASONE SODIUM PHOSPHATE 6 MG: 10 INJECTION, SOLUTION INTRAMUSCULAR; INTRAVENOUS at 23:51

## 2020-10-21 RX ADMIN — DEXAMETHASONE SODIUM PHOSPHATE 6 MG: 10 INJECTION, SOLUTION INTRAMUSCULAR; INTRAVENOUS at 01:04

## 2020-10-21 RX ADMIN — ENOXAPARIN SODIUM 40 MG: 40 INJECTION SUBCUTANEOUS at 08:03

## 2020-10-21 RX ADMIN — FAMOTIDINE 20 MG: 20 TABLET, FILM COATED ORAL at 17:36

## 2020-10-21 RX ADMIN — TAMSULOSIN HYDROCHLORIDE 0.4 MG: 0.4 CAPSULE ORAL at 20:12

## 2020-10-21 RX ADMIN — LOSARTAN POTASSIUM 100 MG: 100 TABLET, FILM COATED ORAL at 01:03

## 2020-10-21 RX ADMIN — SODIUM CHLORIDE, PRESERVATIVE FREE 10 ML: 5 INJECTION INTRAVENOUS at 20:13

## 2020-10-21 RX ADMIN — PERFLUTREN 1.5 ML: 6.52 INJECTION, SUSPENSION INTRAVENOUS at 10:00

## 2020-10-21 RX ADMIN — LOSARTAN POTASSIUM 100 MG: 100 TABLET, FILM COATED ORAL at 20:12

## 2020-10-21 RX ADMIN — INSULIN LISPRO 12 UNITS: 100 INJECTION, SOLUTION INTRAVENOUS; SUBCUTANEOUS at 07:58

## 2020-10-21 RX ADMIN — AMLODIPINE BESYLATE 5 MG: 5 TABLET ORAL at 08:00

## 2020-10-21 RX ADMIN — SODIUM CHLORIDE, PRESERVATIVE FREE 10 ML: 5 INJECTION INTRAVENOUS at 08:04

## 2020-10-21 RX ADMIN — INSULIN LISPRO 16 UNITS: 100 INJECTION, SOLUTION INTRAVENOUS; SUBCUTANEOUS at 17:36

## 2020-10-21 RX ADMIN — INSULIN GLARGINE 24 UNITS: 100 INJECTION, SOLUTION SUBCUTANEOUS at 21:00

## 2020-10-21 NOTE — PROGRESS NOTES
[facility]  [phone]  Jayson Damon MD    Subjective:  LOS: 5    He feels better.  Still requiring high flow but at 4 to 5 L only.  Denies new symptoms.  No cough or nausea vomiting.    Objective   Vital Signs past 24hrs    Temp range: Temp (24hrs), Av.3 °F (35.7 °C), Min:96.2 °F (35.7 °C), Max:96.4 °F (35.8 °C)    BP range: BP: (130-137)/(79-83) 137/83  Pulse range: Heart Rate:  [41-64] 64  Resp rate range: Resp:  [14-18] 18    Device (Oxygen Therapy): high-flow nasal cannula;humidifiedFlow (L/min):  [5-6] 6  Oxygen range:SpO2:  [89 %-95 %] 95 %      93.9 kg (207 lb); Body mass index is 28.87 kg/m².    Intake/Output Summary (Last 24 hours) at 10/21/2020 1626  Last data filed at 10/21/2020 1300  Gross per 24 hour   Intake 720 ml   Output --   Net 720 ml       Physical Exam  Constitutional:       Appearance: Normal appearance.   HENT:      Head: Normocephalic.      Mouth/Throat:      Mouth: Mucous membranes are moist.   Eyes:      Conjunctiva/sclera: Conjunctivae normal.      Pupils: Pupils are equal, round, and reactive to light.   Cardiovascular:      Rate and Rhythm: Regular rhythm. Bradycardia present.      Heart sounds: Normal heart sounds. No murmur.   Pulmonary:      Breath sounds: Rales (few scattered) present.   Abdominal:      General: Bowel sounds are normal. There is no distension.      Palpations: Abdomen is soft. There is no mass.      Tenderness: There is no abdominal tenderness.   Musculoskeletal:         General: No swelling.   Neurological:      Mental Status: He is alert.       Results Review:    I have reviewed the laboratory and imaging data since the last note by [facility] physician.  My annotations are noted in assessment and plan.    Medication Review:  I have reviewed the current MAR.  My annotations are noted in assessment and plan.       Plan   PCCM Problems  AHRF  Covid-19 infection  Bilateral infiltrates, R>L  Bradycardia  DM2      Plan of Treatment  Ongoing supportive  treatment for COVID-19 infection.  Now on dexa 6 mg iv bid and finished Remdesivir. Currently on high flow cannula.  Continue to wean oxygen. Appears better.  Can try on regular nasal cannula.    Note cardiology following for bradycardia.  Continues to have a heart rate in the 40s.    Appears to be better.      Jayson Damon MD  10/21/20  16:26 EDT    While in the room and during my examination of the patient I wore gloves, gown, mask, eye protection and followed enhanced droplet/contact isolation protocol and precautions.  I washed my hands before and after this patient encounter.    Communication:  Please USE SECURE CHAT TO MESSAGE ME in EPIC on In-Patient Care of this patient.     Part of this note may be an electronic transcription/translation of spoken language to printed text using the Dragon Dictation System.

## 2020-10-21 NOTE — PROGRESS NOTES
[facility]  Cardiology Progress Note    Patient Identification:  Name: Donta Maria  Age: 65 y.o.  Sex: male  :  1955  MRN: [id number]                 Follow Up / Chief Complaint: Bradycardia    Interval History: Remains asymptomatic for bradycardia     Subjective:     covid    Objective:    Past Medical History:  Past Medical History:   Diagnosis Date   • Diabetes mellitus (CMS/HCC)    • Hypertension    • Rheumatoid arthritis (CMS/HCC)    • Rheumatoid arthritis (CMS/HCC)      Past Surgical History:  History reviewed. No pertinent surgical history.     Social History:   Social History     Tobacco Use   • Smoking status: Former Smoker     Types: Cigarettes     Quit date: 11/15/2008     Years since quittin.9   • Smokeless tobacco: Former User   Substance Use Topics   • Alcohol use: Yes     Comment: rarely      Family History:  History reviewed. No pertinent family history.       Allergies:  Allergies   Allergen Reactions   • Lisinopril Other (See Comments)     Dry cough     Scheduled Meds:  amLODIPine, 5 mg, Q24H  dexamethasone, 6 mg, Q12H  enoxaparin, 40 mg, Q24H  famotidine, 20 mg, BID AC  insulin glargine, 24 Units, Nightly  insulin lispro, 0-24 Units, TID AC  losartan, 100 mg, Nightly  sodium chloride, 10 mL, Q12H  tamsulosin, 0.4 mg, Nightly            INTAKE AND OUTPUT:    Intake/Output Summary (Last 24 hours) at 10/21/2020 1500  Last data filed at 10/21/2020 1100  Gross per 24 hour   Intake 480 ml   Output --   Net 480 ml       Review of Systems:    GI:  Cardiac:  Pulmonary:    Constitutional:  Temp:  [96.2 °F (35.7 °C)-96.4 °F (35.8 °C)] 96.2 °F (35.7 °C)  Heart Rate:  [41-64] 64  Resp:  [14-18] 18  BP: (130-137)/(79-83) 137/83    Physical Exam:     cdc requirement for covid        Cardiographics  Telemetry:                 ECG:      Interpretation Summary    · Left ventricular ejection fraction appears to be 51 - 55%.  · Left ventricular ejection fraction appears to be 51 -  "55%  · There is no evidence of pericardial effusion.         Lab Review   Results from last 7 days   Lab Units 10/19/20  0834 10/17/20  0640   CK TOTAL U/L  --  68   TROPONIN T ng/mL <0.010  --      Results from last 7 days   Lab Units 10/17/20  0640   MAGNESIUM mg/dL 2.1     Results from last 7 days   Lab Units 10/20/20  0742   SODIUM mmol/L 139   POTASSIUM mmol/L 4.2   BUN mg/dL 18   CREATININE mg/dL 0.84   CALCIUM mg/dL 8.7        Results from last 7 days   Lab Units 10/19/20  0834 10/18/20  0945 10/16/20  0745   WBC 10*3/mm3 5.38 5.64 3.80   HEMOGLOBIN g/dL 14.6 14.2 14.8   HEMATOCRIT % 43.9 42.2 42.6   PLATELETS 10*3/mm3 198 177 143         The following medical decision was discussed in detail with Dr. Jay    Assessment:  Bradycardia asymptomatic  Pause noted 4.0, as above  Acute respiratory failure with hypoxia viral pneumonia pneumonia due to COVID-19 virus  Hypokalemia   hyponatremia  Elevated LFTs  diabetes mellitus   sepsis due to pneumonia        Plan:  Bradycardia and pauses better, con conservative treatment    Nena Jay MD          )10/21/2020       EMR Dragon/Transcription:   \"Dictated utilizing Dragon dictation\".     "

## 2020-10-21 NOTE — PROGRESS NOTES
Name: Donta Maria ADMIT: 10/15/2020   : 1955  PCP: Nena Maxwell MD    MRN: [id number] LOS: 5 days   AGE/SEX: 65 y.o. male  ROOM: [facility]   Subjective   Chief Complaint   Patient presents with   • COVID +   • Shortness of Breath      6L oxygen  +dyspnea  On steroids  BG very high- 267-350  On insulin    ROS  No f/c  No n/v  No cp/palp  +soa/cough    Objective   Vital Signs  Temp:  [96.2 °F (35.7 °C)-97.7 °F (36.5 °C)] 96.2 °F (35.7 °C)  Heart Rate:  [41-64] 64  Resp:  [14-18] 18  BP: (130-137)/(79-83) 137/83  SpO2:  [89 %-95 %] 95 %  on  Flow (L/min):  [5-6] 6;   Device (Oxygen Therapy): high-flow nasal cannula;humidified  Body mass index is 28.87 kg/m².    More limited exam done as pulmonary following    Physical Exam  Constitutional:       Appearance: He is well-developed. He is ill-appearing.   HENT:      Head: Normocephalic and atraumatic.   Eyes:      General: No scleral icterus.  Neck:      Musculoskeletal: Neck supple.   Cardiovascular:      Rate and Rhythm: Regular rhythm. Bradycardia present.   Pulmonary:      Effort: Respiratory distress present.      Breath sounds: No wheezing.   Abdominal:      General: There is no distension.      Palpations: Abdomen is soft.      Tenderness: There is no abdominal tenderness.   Neurological:      Mental Status: He is alert.   Psychiatric:         Behavior: Behavior normal.         Results Review:       I reviewed the patient's new clinical results.  Results from last 7 days   Lab Units 10/19/20  0834 10/18/20  0945 10/16/20  0745 10/15/20  1808   WBC 10*3/mm3 5.38 5.64 3.80 4.87   HEMOGLOBIN g/dL 14.6 14.2 14.8 15.2   PLATELETS 10*3/mm3 198 177 143 144     Results from last 7 days   Lab Units 10/20/20  0742 10/19/20  0834 10/18/20  0945 10/17/20  0640  10/15/20  1808   SODIUM mmol/L 139 140  --  137  --  135*   POTASSIUM mmol/L 4.2 4.4  --  3.8  --  3.2*   CHLORIDE mmol/L 101 100  --  102  --  99   CO2 mmol/L 27.6 29.5*  --  26.6  --  24.2   BUN mg/dL 18  18  --  17  --  14   CREATININE mg/dL 0.84 0.72* 0.86 0.84   < > 0.91   GLUCOSE mg/dL 292* 308*  --  300*  --  276*    < > = values in this interval not displayed.   Estimated Creatinine Clearance: 101.2 mL/min (by C-G formula based on SCr of 0.84 mg/dL).  Results from last 7 days   Lab Units 10/20/20  0742 10/19/20  0834 10/18/20  0945 10/17/20  0640   ALBUMIN g/dL 3.30* 3.70 3.40* 3.30*   BILIRUBIN mg/dL 0.3 0.4 0.4 0.2   ALK PHOS U/L 69 76 78 72   AST (SGOT) U/L 24 24 26 33   ALT (SGPT) U/L 23 26 28 28     Results from last 7 days   Lab Units 10/20/20  0742 10/19/20  0834 10/18/20  0945 10/17/20  0640  10/15/20  1808   CALCIUM mg/dL 8.7 9.2  --  8.7  --  9.2   ALBUMIN g/dL 3.30* 3.70 3.40* 3.30*   < > 3.90   MAGNESIUM mg/dL  --   --   --  2.1  --   --     < > = values in this interval not displayed.     Results from last 7 days   Lab Units 10/18/20  0945 10/15/20  1808   PROCALCITONIN ng/mL <0.02 0.06   LACTATE mmol/L  --  1.2       Coag     HbA1C   Lab Results   Component Value Date    HGBA1C 11.20 (H) 10/19/2020    HGBA1C 8.3 (H) 01/28/2020     Infection   Results from last 7 days   Lab Units 10/18/20  0945 10/15/20  1808   BLOODCX   --  No growth at 5 days  No growth at 5 days   PROCALCITONIN ng/mL <0.02 0.06     Radiology(recent) No radiology results for the last day  Troponin T   Date Value Ref Range Status   10/19/2020 <0.010 0.000 - 0.030 ng/mL Final     No components found for: TSH;2    amLODIPine, 5 mg, Oral, Q24H  dexamethasone, 6 mg, Intravenous, Q12H  enoxaparin, 40 mg, Subcutaneous, Q24H  famotidine, 20 mg, Oral, BID AC  insulin glargine, 20 Units, Subcutaneous, Nightly  insulin lispro, 0-24 Units, Subcutaneous, TID AC  losartan, 100 mg, Oral, Nightly  sodium chloride, 10 mL, Intravenous, Q12H  tamsulosin, 0.4 mg, Oral, Nightly       Diet Regular; Consistent Carbohydrate      Assessment/Plan      Active Hospital Problems    Diagnosis  POA   • **Pneumonia due to COVID-19 virus [U07.1, J12.89]  Yes      • Type 2 diabetes mellitus with hyperglycemia (CMS/[facility]) [E11.65]  Yes   • Sepsis due to pneumonia (CMS/[facility]) [J18.9, A41.9]  Yes   • Viral pneumonia [J12.9]  Yes   • Hypokalemia [E87.6]  Yes   • Hyponatremia [E87.1]  Yes   • LFTs abnormal [R94.5]  Yes   • Acute respiratory failure with hypoxia (CMS/[facility]) [J96.01]  Yes      Resolved Hospital Problems   No resolved problems to display.       · Supportive care with dexamethasone, remdesivir  · High oxygen requirements, monitor closely. A little better over the past 2 days  · Increase lantus again on 10/21, increased SSI, monitor BG closely  · Monitor labs  · lovenox for dvt proph  · Asymptomatic bradycardia- but significant at times- Cardiology following closely    Thanks to specialists who are following    ROBINSON RN  Reviewed records    Riley Chang MD  [facility]  10/21/20  10:43 EDT

## 2020-10-21 NOTE — PLAN OF CARE
Goal Outcome Evaluation:  Plan of Care Reviewed With: patient  Progress: improving   Monitor VS. Heart rate, o2 at 6L high flow tubing. Monitor breath sounds , encouraging incentive spirometer up to [phone]. ENC, Increase activity in room, up in chair. Ad pedro. Will continue to monitor.

## 2020-10-21 NOTE — PLAN OF CARE
Problem: Adult Inpatient Plan of Care  Goal: Plan of Care Review  Outcome: Ongoing, Progressing  Flowsheets (Taken 10/21/2020 0556)  Progress: improving  Plan of Care Reviewed With: patient  Outcome Summary:   No s/sx of distress noted at this time. Rested some throughout night. Vital signs WDL. On 6L highflow [place]   tolerated. SB on monitor with heart rate ranging from 38 to upper 50s. Patient asymptomatic. Will cont to monitor.  Goal: Patient-Specific Goal (Individualized)  Outcome: Ongoing, Progressing  Goal: Absence of Hospital-Acquired Illness or Injury  Outcome: Ongoing, Progressing  Intervention: Identify and Manage Fall Risk  Recent Flowsheet Documentation  Taken 10/21/2020 0206 by Lisa Omer, RN  Safety Promotion/Fall Prevention:   activity supervised   assistive device/personal items within reach   clutter free environment maintained   fall prevention program maintained   nonskid shoes/slippers when out of bed   room organization consistent   safety round/check completed  Taken 10/21/2020 0004 by Lisa Omer, RN  Safety Promotion/Fall Prevention:   activity supervised   assistive device/personal items within reach   clutter free environment maintained   fall prevention program maintained   lighting adjusted   nonskid shoes/slippers when out of bed   room organization consistent   safety round/check completed  Taken 10/20/2020 2226 by Lisa Omer, RN  Safety Promotion/Fall Prevention:   activity supervised   assistive device/personal items within reach   clutter free environment maintained   fall prevention program maintained   nonskid shoes/slippers when out of bed   room organization consistent   safety round/check completed  Taken 10/20/2020 2033 by Lisa Omer, RN  Safety Promotion/Fall Prevention:   activity supervised   assistive device/personal items within reach   clutter free environment maintained   fall prevention program maintained   lighting adjusted   nonskid shoes/slippers when out of  bed   room organization consistent   safety round/check completed  Intervention: Prevent Skin Injury  Recent Flowsheet Documentation  Taken 10/21/2020 0206 by Lisa Omer RN  Body Position: position changed independently  Taken 10/21/2020 0004 by Lisa Omer RN  Body Position: position changed independently  Taken 10/20/2020 2226 by Lisa Omer RN  Body Position: position changed independently  Taken 10/20/2020 2033 by Lisa Omer RN  Body Position:   position changed independently   sitting up in bed  Intervention: Prevent and Manage VTE (venous thromboembolism) Risk  Recent Flowsheet Documentation  Taken 10/21/2020 0004 by Lisa Omer RN  VTE Prevention/Management: (Lovenox) other (see comments)  Taken 10/20/2020 2033 by Lisa Omer RN  VTE Prevention/Management: (Lovenox) other (see comments)  Intervention: Prevent Infection  Recent Flowsheet Documentation  Taken 10/21/2020 0206 by Lisa Omer RN  Infection Prevention:   rest/sleep promoted   single patient room provided  Taken 10/21/2020 0004 by Lisa Omer RN  Infection Prevention:   rest/sleep promoted   single patient room provided  Taken 10/20/2020 2226 by Lisa Omer RN  Infection Prevention:   rest/sleep promoted   single patient room provided  Taken 10/20/2020 2033 by Lisa Omer RN  Infection Prevention:   rest/sleep promoted   single patient room provided  Goal: Optimal Comfort and Wellbeing  Outcome: Ongoing, Progressing  Intervention: Provide Person-Centered Care  Recent Flowsheet Documentation  Taken 10/21/2020 0004 by Lisa Omer RN  Trust Relationship/Rapport:   care explained   thoughts/feelings acknowledged   reassurance provided  Taken 10/20/2020 2033 by Lisa Omer RN  Trust Relationship/Rapport:   care explained   thoughts/feelings acknowledged  Goal: Readiness for Transition of Care  Outcome: Ongoing, Progressing     Problem: Skin Injury Risk Increased  Goal: Skin Health and Integrity  Outcome: Ongoing,  Progressing  Intervention: Optimize Skin Protection  Recent Flowsheet Documentation  Taken 10/21/2020 0004 by Lisa Omer RN  Pressure Reduction Techniques: frequent weight shift encouraged  Skin Protection:   adhesive use limited   incontinence pads utilized   transparent dressing maintained   tubing/devices free from skin contact  Taken 10/20/2020 2033 by Lisa Omer RN  Pressure Reduction Techniques: frequent weight shift encouraged  Skin Protection:   adhesive use limited   hydrocolloids used   transparent dressing maintained   tubing/devices free from skin contact     Problem: Fall Injury Risk  Goal: Absence of Fall and Fall-Related Injury  Outcome: Ongoing, Progressing  Intervention: Identify and Manage Contributors to Fall Injury Risk  Recent Flowsheet Documentation  Taken 10/21/2020 0206 by Lisa Omer RN  Medication Review/Management: medications reviewed  Taken 10/21/2020 0004 by Lisa Omer RN  Medication Review/Management: medications reviewed  Taken 10/20/2020 2226 by Lsia Omer RN  Medication Review/Management: medications reviewed  Taken 10/20/2020 2033 by Lisa Omer RN  Medication Review/Management: medications reviewed  Intervention: Promote Injury-Free Environment  Recent Flowsheet Documentation  Taken 10/21/2020 0206 by Lisa Omer RN  Safety Promotion/Fall Prevention:   activity supervised   assistive device/personal items within reach   clutter free environment maintained   fall prevention program maintained   nonskid shoes/slippers when out of bed   room organization consistent   safety round/check completed  Taken 10/21/2020 0004 by Lisa Omer RN  Safety Promotion/Fall Prevention:   activity supervised   assistive device/personal items within reach   clutter free environment maintained   fall prevention program maintained   lighting adjusted   nonskid shoes/slippers when out of bed   room organization consistent   safety round/check completed  Taken 10/20/2020 2226 by Kan  Lisa, RN  Safety Promotion/Fall Prevention:   activity supervised   assistive device/personal items within reach   clutter free environment maintained   fall prevention program maintained   nonskid shoes/slippers when out of bed   room organization consistent   safety round/check completed  Taken 10/20/2020 2033 by Lisa Omer, RN  Safety Promotion/Fall Prevention:   activity supervised   assistive device/personal items within reach   clutter free environment maintained   fall prevention program maintained   lighting adjusted   nonskid shoes/slippers when out of bed   room organization consistent   safety round/check completed   Goal Outcome Evaluation:  Plan of Care Reviewed With: patient  Progress: improving  Outcome Summary: No s/sx of distress noted at this time. Rested some throughout night. Vital signs WDL. On 6L highflow [place]; tolerated. SB on monitor with heart rate ranging from 38 to upper 50s. Patient asymptomatic. Will cont to monitor.

## 2020-10-22 LAB
GLUCOSE BLDC GLUCOMTR-MCNC: 352 MG/DL (ref 70–130)
GLUCOSE BLDC GLUCOMTR-MCNC: 378 MG/DL (ref 70–130)
GLUCOSE BLDC GLUCOMTR-MCNC: 379 MG/DL (ref 70–130)
GLUCOSE BLDC GLUCOMTR-MCNC: 388 MG/DL (ref 70–130)

## 2020-10-22 PROCEDURE — 63710000001 INSULIN GLARGINE PER 5 UNITS: Performed by: INTERNAL MEDICINE

## 2020-10-22 PROCEDURE — 63710000001 INSULIN LISPRO (HUMAN) PER 5 UNITS: Performed by: INTERNAL MEDICINE

## 2020-10-22 PROCEDURE — 94799 UNLISTED PULMONARY SVC/PX: CPT

## 2020-10-22 PROCEDURE — 82962 GLUCOSE BLOOD TEST: CPT

## 2020-10-22 PROCEDURE — 99231 SBSQ HOSP IP/OBS SF/LOW 25: CPT | Performed by: INTERNAL MEDICINE

## 2020-10-22 PROCEDURE — 25010000002 ENOXAPARIN PER 10 MG: Performed by: HOSPITALIST

## 2020-10-22 PROCEDURE — 25010000002 DEXAMETHASONE SODIUM PHOSPHATE 10 MG/ML SOLUTION: Performed by: INTERNAL MEDICINE

## 2020-10-22 RX ORDER — INSULIN GLARGINE 100 [IU]/ML
10 INJECTION, SOLUTION SUBCUTANEOUS ONCE
Status: COMPLETED | OUTPATIENT
Start: 2020-10-22 | End: 2020-10-22

## 2020-10-22 RX ORDER — INSULIN GLARGINE 100 [IU]/ML
30 INJECTION, SOLUTION SUBCUTANEOUS NIGHTLY
Status: DISCONTINUED | OUTPATIENT
Start: 2020-10-22 | End: 2020-10-24 | Stop reason: HOSPADM

## 2020-10-22 RX ADMIN — ENOXAPARIN SODIUM 40 MG: 40 INJECTION SUBCUTANEOUS at 08:57

## 2020-10-22 RX ADMIN — INSULIN LISPRO 20 UNITS: 100 INJECTION, SOLUTION INTRAVENOUS; SUBCUTANEOUS at 08:57

## 2020-10-22 RX ADMIN — FAMOTIDINE 20 MG: 20 TABLET, FILM COATED ORAL at 08:56

## 2020-10-22 RX ADMIN — TAMSULOSIN HYDROCHLORIDE 0.4 MG: 0.4 CAPSULE ORAL at 20:30

## 2020-10-22 RX ADMIN — FAMOTIDINE 20 MG: 20 TABLET, FILM COATED ORAL at 17:35

## 2020-10-22 RX ADMIN — SODIUM CHLORIDE, PRESERVATIVE FREE 10 ML: 5 INJECTION INTRAVENOUS at 22:30

## 2020-10-22 RX ADMIN — INSULIN GLARGINE 30 UNITS: 100 INJECTION, SOLUTION SUBCUTANEOUS at 20:31

## 2020-10-22 RX ADMIN — DEXAMETHASONE SODIUM PHOSPHATE 6 MG: 10 INJECTION, SOLUTION INTRAMUSCULAR; INTRAVENOUS at 12:38

## 2020-10-22 RX ADMIN — DEXAMETHASONE SODIUM PHOSPHATE 6 MG: 10 INJECTION, SOLUTION INTRAMUSCULAR; INTRAVENOUS at 22:40

## 2020-10-22 RX ADMIN — SODIUM CHLORIDE, PRESERVATIVE FREE 10 ML: 5 INJECTION INTRAVENOUS at 08:57

## 2020-10-22 RX ADMIN — INSULIN GLARGINE 10 UNITS: 100 INJECTION, SOLUTION SUBCUTANEOUS at 12:30

## 2020-10-22 RX ADMIN — INSULIN LISPRO 20 UNITS: 100 INJECTION, SOLUTION INTRAVENOUS; SUBCUTANEOUS at 12:38

## 2020-10-22 RX ADMIN — LOSARTAN POTASSIUM 100 MG: 100 TABLET, FILM COATED ORAL at 20:29

## 2020-10-22 RX ADMIN — INSULIN LISPRO 3 UNITS: 100 INJECTION, SOLUTION INTRAVENOUS; SUBCUTANEOUS at 12:38

## 2020-10-22 RX ADMIN — INSULIN LISPRO 20 UNITS: 100 INJECTION, SOLUTION INTRAVENOUS; SUBCUTANEOUS at 17:36

## 2020-10-22 RX ADMIN — INSULIN LISPRO 3 UNITS: 100 INJECTION, SOLUTION INTRAVENOUS; SUBCUTANEOUS at 17:36

## 2020-10-22 RX ADMIN — Medication 3 MG: at 22:40

## 2020-10-22 NOTE — PLAN OF CARE
Goal Outcome Evaluation:  Plan of Care Reviewed With: patient  Progress: improving  Outcome Summary: No c/o pain. VSS on 7L o2. Soa on excertion. Restful night. Currently resting in bed. Will continue to monitor.

## 2020-10-22 NOTE — PAYOR COMM NOTE
"Donta Butt (65 y.o. Male)     PLEASE SEE ATTACHED CLINICAL REVIEW.     REF# [id number]    PLEASE CALL   OR  [phone] WITH CONTINUED STAY AUTH AND DAYS APPROVED.    THANK YOU    KAYCE PEDRO LPN CCP    Date of Birth Social Security Number Address Home Phone MRN    1955  [address] [facility] [phone] [id number]    [employer] Marital Status          None Single       Admission Date Admission Type Admitting Provider Attending Provider Department, Room/Bed    10/15/20 Emergency Edling, MD Bernardo Montero Alan David, MD [address], [address]    Discharge Date Discharge Disposition Discharge Destination                       Attending Provider: Riley Chang MD    Allergies: Lisinopril    Isolation: Enh Drop/Con   Infection: COVID (confirmed) (10/15/20)   Code Status: CPR    Ht: 180.3 cm (71\")   Wt: 93.9 kg (207 lb)    Admission Cmt: None   Principal Problem: Pneumonia due to COVID-19 virus [U07.1,J12.89]                 Active Insurance as of 10/15/2020     Primary Coverage     Payor Plan Insurance Group Employer/Plan Group    [employer] CROSS BLUE [employer] PPO [id number]     Payor Plan Address Payor Plan Phone Number Payor Plan Fax Number Effective Dates    [address] [phone]  6/25/2019 - None Entered    [place] [address]       Subscriber Name Subscriber Birth Date Member ID       DONTA BUTT 1955 [id number]                 Emergency Contacts      (Rel.) Home Phone Work Phone Mobile Phone    Shakira Saldana (Sister) -- -- [phone]              Blood Administration Record (From admission, onward)    Completed transfusions     Ordered     Start    10/16/20 1243  Transfuse Convalescent Plasma for COVID-19  Transfusion     Released Time Blood Unit Number Status   10/16/20 1638   20  [id number]  [id number] Completed 10/17/20 0103       10/16/20 1243                "       Operative/Procedure Notes (last 24 hours) (Notes from 10/21/20 1511 through 10/22/20 151)    No notes of this type exist for this encounter.            Physician Progress Notes (last 24 hours) (Notes from 10/21/20 151 through 10/22/20 151)      Jayson Damon MD at 10/22/20 1337          [facility]  [phone]  Jayson Damon MD    Subjective:  LOS: 6    Remains on HF but overall better. No n/v and denies sig cough or phlegm. Mostly just feels fatigued.    Objective   Vital Signs past 24hrs    Temp range: Temp (24hrs), Av.9 °F (36.1 °C), Min:96.5 °F (35.8 °C), Max:97.1 °F (36.2 °C)    BP range: BP: (127-137)/(77-81) 137/78  Pulse range: Heart Rate:  [43-53] 53  Resp rate range: Resp:  [16-18] 16    Device (Oxygen Therapy): high-flow nasal cannulaFlow (L/min):  [6-7] 6  Oxygen range:SpO2:  [91 %-96 %] 93 %      93.9 kg (207 lb); Body mass index is 28.87 kg/m².    Intake/Output Summary (Last 24 hours) at 10/22/2020 1337  Last data filed at 10/21/2020 1700  Gross per 24 hour   Intake 120 ml   Output --   Net 120 ml       Physical Exam  Constitutional:       Appearance: Normal appearance.   HENT:      Head: Normocephalic.      Mouth/Throat:      Mouth: Mucous membranes are moist.   Eyes:      Conjunctiva/sclera: Conjunctivae normal.      Pupils: Pupils are equal, round, and reactive to light.   Cardiovascular:      Rate and Rhythm: Regular rhythm. Bradycardia present.      Heart sounds: Normal heart sounds. No murmur.   Pulmonary:      Breath sounds: Rales (few scattered) present.   Abdominal:      General: Bowel sounds are normal. There is no distension.      Palpations: Abdomen is soft. There is no mass.      Tenderness: There is no abdominal tenderness.   Musculoskeletal:         General: No swelling.   Neurological:      Mental Status: He is alert.       Results Review:    I have reviewed the laboratory and imaging data since the last note by [facility] physician.  My annotations are noted in  assessment and plan.    COVID LABS:  Results From Last 14 Days   Lab Units 10/19/20  0834 10/18/20  0945 10/17/20  0640 10/16/20  1325 10/15/20  1928 10/15/20  1808   CK TOTAL U/L  --   --  68  --   --   --    CRP mg/dL  --  3.96* 3.22* 5.86*  --   --    D DIMER QUANT MCGFEU/mL  --  0.66* 0.90*  --  0.63*  --    LACTATE mmol/L  --   --   --   --   --  1.2   LDH U/L  --   --  423*  --   --   --    PROCALCITONIN ng/mL  --  <0.02  --   --   --  0.06   TROPONIN T ng/mL <0.010  --   --   --   --   --          Medication Review:  I have reviewed the current MAR.  My annotations are noted in assessment and plan.       Plan   PCCM Problems  AHRF  Covid-19 infection  Bilateral infiltrates, R>L  Bradycardia  DM2      Plan of Treatment  Ongoing supportive treatment for COVID-19 infection.  Now on dexa 6 mg iv bid and finished Remdesivir. Currently on high flow cannula.  Continue to wean oxygen. Appears better.  Will try on regular nasal cannula. Check Covid labs.    Note cardiology following for bradycardia.  Continues to have a heart rate in the 40s.    Appears to be better.      Jayson Damon MD  10/22/20  13:37 EDT    While in the room and during my examination of the patient I wore gloves, gown, mask, eye protection and followed enhanced droplet/contact isolation protocol and precautions.  I washed my hands before and after this patient encounter.    Communication:  Please USE SECURE CHAT TO MESSAGE ME in EPIC on In-Patient Care of this patient.     Part of this note may be an electronic transcription/translation of spoken language to printed text using the Dragon Dictation System.      Electronically signed by Jayson Damon MD at 10/22/20 1341     Riley Chang MD at 10/22/20 1331              Name: Donta Maria ADMIT: 10/15/2020   : 1955  PCP: Nena Maxwell MD    MRN: [id number] LOS: 6 days   AGE/SEX: 65 y.o. male  ROOM: [facility]   Subjective   Chief Complaint   Patient presents with   • COVID +   •  Shortness of Breath      6L oxygen  +dyspnea  On steroids  BG very high- upper 300s  On insulin only with partial improvement  Low HR at times- no symptoms    ROS  No f/c  No n/v  No cp/palp  +soa/cough    Objective   Vital Signs  Temp:  [96.5 °F (35.8 °C)-97.1 °F (36.2 °C)] 96.8 °F (36 °C)  Heart Rate:  [43-53] 53  Resp:  [16-18] 16  BP: (127-137)/(77-81) 137/78  SpO2:  [91 %-96 %] 93 %  on  Flow (L/min):  [6-7] 6;   Device (Oxygen Therapy): high-flow nasal cannula  Body mass index is 28.87 kg/m².    More limited exam done as pulmonary following    Physical Exam  Constitutional:       Appearance: He is well-developed. He is ill-appearing.   HENT:      Head: Normocephalic and atraumatic.   Eyes:      General: No scleral icterus.  Neck:      Musculoskeletal: Neck supple.   Cardiovascular:      Rate and Rhythm: Regular rhythm. Bradycardia present.   Pulmonary:      Effort: Respiratory distress present.      Breath sounds: No wheezing.   Abdominal:      General: There is no distension.      Palpations: Abdomen is soft.      Tenderness: There is no abdominal tenderness.   Neurological:      Mental Status: He is alert.   Psychiatric:         Behavior: Behavior normal.         Results Review:       I reviewed the patient's new clinical results.  Results from last 7 days   Lab Units 10/19/20  0834 10/18/20  0945 10/16/20  0745 10/15/20  1808   WBC 10*3/mm3 5.38 5.64 3.80 4.87   HEMOGLOBIN g/dL 14.6 14.2 14.8 15.2   PLATELETS 10*3/mm3 198 177 143 144     Results from last 7 days   Lab Units 10/20/20  0742 10/19/20  0834 10/18/20  0945 10/17/20  0640  10/15/20  1808   SODIUM mmol/L 139 140  --  137  --  135*   POTASSIUM mmol/L 4.2 4.4  --  3.8  --  3.2*   CHLORIDE mmol/L 101 100  --  102  --  99   CO2 mmol/L 27.6 29.5*  --  26.6  --  24.2   BUN mg/dL 18 18  --  17  --  14   CREATININE mg/dL 0.84 0.72* 0.86 0.84   < > 0.91   GLUCOSE mg/dL 292* 308*  --  300*  --  276*    < > = values in this interval not displayed.    Estimated Creatinine Clearance: 101.2 mL/min (by C-G formula based on SCr of 0.84 mg/dL).  Results from last 7 days   Lab Units 10/20/20  0742 10/19/20  0834 10/18/20  0945 10/17/20  0640   ALBUMIN g/dL 3.30* 3.70 3.40* 3.30*   BILIRUBIN mg/dL 0.3 0.4 0.4 0.2   ALK PHOS U/L 69 76 78 72   AST (SGOT) U/L 24 24 26 33   ALT (SGPT) U/L 23 26 28 28     Results from last 7 days   Lab Units 10/20/20  0742 10/19/20  0834 10/18/20  0945 10/17/20  0640  10/15/20  1808   CALCIUM mg/dL 8.7 9.2  --  8.7  --  9.2   ALBUMIN g/dL 3.30* 3.70 3.40* 3.30*   < > 3.90   MAGNESIUM mg/dL  --   --   --  2.1  --   --     < > = values in this interval not displayed.     Results from last 7 days   Lab Units 10/18/20  0945 10/15/20  1808   PROCALCITONIN ng/mL <0.02 0.06   LACTATE mmol/L  --  1.2       Coag     HbA1C   Lab Results   Component Value Date    HGBA1C 11.20 (H) 10/19/2020    HGBA1C 8.3 (H) 01/28/2020     Infection   Results from last 7 days   Lab Units 10/18/20  0945 10/15/20  1808   BLOODCX   --  No growth at 5 days  No growth at 5 days   PROCALCITONIN ng/mL <0.02 0.06     Radiology(recent) No radiology results for the last day  No results found for: TROPONINT, TROPONINI, BNP  No components found for: TSH;2    dexamethasone, 6 mg, Intravenous, Q12H  enoxaparin, 40 mg, Subcutaneous, Q24H  famotidine, 20 mg, Oral, BID AC  insulin glargine, 30 Units, Subcutaneous, Nightly  insulin lispro, 0-24 Units, Subcutaneous, TID AC  insulin lispro, 3 Units, Subcutaneous, TID With Meals  losartan, 100 mg, Oral, Nightly  sodium chloride, 10 mL, Intravenous, Q12H  tamsulosin, 0.4 mg, Oral, Nightly       Diet Regular; Consistent Carbohydrate      Assessment/Plan     Active Hospital Problems    Diagnosis  POA   • **Pneumonia due to COVID-19 virus [U07.1, J12.89]  Yes   • Type 2 diabetes mellitus with hyperglycemia (CMS/[facility]) [E11.65]  Yes   • Sepsis due to pneumonia (CMS/[facility]) [J18.9, A41.9]  Yes   • Viral pneumonia [J12.9]  Yes   • Hypokalemia  [E87.6]  Yes   • Hyponatremia [E87.1]  Yes   • LFTs abnormal [R94.5]  Yes   • Acute respiratory failure with hypoxia (CMS/HCC) [J96.01]  Yes      Resolved Hospital Problems   No resolved problems to display.       · Supportive care with dexamethasone, remdesivir  · High oxygen requirements, monitor closely. A little better over the past 2 days  · Increase lantus again on 10/22, increased SSI, add scheduled insulin with meals as well, monitor BG closely  · Monitor labs  · lovenox for dvt proph  · Asymptomatic bradycardia- but significant at times- Cardiology following closely    Thanks to specialists who are following    DW RN  DW pharmacy  Reviewed records    Riley Chang MD  [facility]  10/22/20  10:43 EDT    Electronically signed by Riley Chang MD at 10/22/20 1333     Jayson Damon MD at 10/21/20 1626          [facility]  [phone]  Jayson Damon MD    Subjective:  LOS: 5    He feels better.  Still requiring high flow but at 4 to 5 L only.  Denies new symptoms.  No cough or nausea vomiting.    Objective   Vital Signs past 24hrs    Temp range: Temp (24hrs), Av.3 °F (35.7 °C), Min:96.2 °F (35.7 °C), Max:96.4 °F (35.8 °C)    BP range: BP: (130-137)/(79-83) 137/83  Pulse range: Heart Rate:  [41-64] 64  Resp rate range: Resp:  [14-18] 18    Device (Oxygen Therapy): high-flow nasal cannula;humidifiedFlow (L/min):  [5-6] 6  Oxygen range:SpO2:  [89 %-95 %] 95 %      93.9 kg (207 lb); Body mass index is 28.87 kg/m².    Intake/Output Summary (Last 24 hours) at 10/21/2020 1626  Last data filed at 10/21/2020 1300  Gross per 24 hour   Intake 720 ml   Output --   Net 720 ml       Physical Exam  Constitutional:       Appearance: Normal appearance.   HENT:      Head: Normocephalic.      Mouth/Throat:      Mouth: Mucous membranes are moist.   Eyes:      Conjunctiva/sclera: Conjunctivae normal.      Pupils: Pupils are equal, round, and reactive to light.   Cardiovascular:       Rate and Rhythm: Regular rhythm. Bradycardia present.      Heart sounds: Normal heart sounds. No murmur.   Pulmonary:      Breath sounds: Rales (few scattered) present.   Abdominal:      General: Bowel sounds are normal. There is no distension.      Palpations: Abdomen is soft. There is no mass.      Tenderness: There is no abdominal tenderness.   Musculoskeletal:         General: No swelling.   Neurological:      Mental Status: He is alert.       Results Review:    I have reviewed the laboratory and imaging data since the last note by [facility] physician.  My annotations are noted in assessment and plan.    Medication Review:  I have reviewed the current MAR.  My annotations are noted in assessment and plan.       Plan   [facility]  Covid-19 infection  Bilateral infiltrates, R>L  Bradycardia  DM2      Plan of Treatment  Ongoing supportive treatment for COVID-19 infection.  Now on dexa 6 mg iv bid and finished Remdesivir. Currently on high flow cannula.  Continue to wean oxygen. Appears better.  Can try on regular nasal cannula.    Note cardiology following for bradycardia.  Continues to have a heart rate in the 40s.    Appears to be better.      Jayson Damon MD  10/21/20  16:26 EDT    While in the room and during my examination of the patient I wore gloves, gown, mask, eye protection and followed enhanced droplet/contact isolation protocol and precautions.  I washed my hands before and after this patient encounter.    Communication:  Please USE SECURE CHAT TO MESSAGE ME in EPIC on In-Patient Care of this patient.     Part of this note may be an electronic transcription/translation of spoken language to printed text using the Dragon Dictation System.      Electronically signed by Jayson Damon MD at 10/21/20 1026       Oxygen Therapy (last day)     Date/Time   SpO2   Device (Oxygen Therapy)   Flow (L/min)   Oxygen Concentration (%)   ETCO2 (mmHg)    10/22/20 1500   --   nasal cannula;humidified   6   --   --     10/22/20 1345   --   high-flow nasal cannula   6   --   --    10/22/20 0858   --   high-flow nasal cannula   6   --   --    10/22/20 0700   93   nasal cannula   7   --   --    10/22/20 0000   --   nasal cannula   7   --   --    10/21/20 2352   95   nasal cannula   7   --   --    10/21/20 2030   --   high-flow nasal cannula   7   --   --    10/21/20 2015   91   high-flow nasal cannula   7   --   --    10/21/20 1653   96   high-flow nasal cannula   6   --   --    10/21/20 1600   --   high-flow nasal cannula;humidified   6   --   --    10/21/20 1200   --   high-flow nasal cannula;humidified   6   --   --    10/21/20 0900   95   high-flow nasal cannula;humidified   6   --   --    10/21/20 0800   --   high-flow nasal cannula   6   --   --    10/21/20 0740   92   high-flow nasal cannula   6   --   --    10/21/20 0700   (!) 89   --   --   --   --    10/21/20 0626   95   --   --   --   --    10/21/20 0603   90   --   --   --   --    10/21/20 0500   95   --   --   --   --    10/21/20 0100   93   --   --   --   --    10/21/20 0054   92   --   --   --   --    10/21/20 0048   94   --   --   --   --    10/21/20 0046   94   --   --   --   --    10/21/20 0038   93   --   --   --   --    10/21/20 0004   --   high-flow nasal cannula;humidified   6   --   --    10/21/20 0002   93   --   --   --   --            All medication doses during the admission are shown, including meds that are no longer on order.   Scheduled Meds Sorted by Name  for Donta Maria as of 10/20/20 through 10/22/20    1 Day 3 Days 7 Days 10 Days <  Today >     Legend:                          Inactive     Active     Other Encounter     Linked                 Medications 10/20/20 10/21/20 10/22/20   amLODIPine (NORVASC) tablet 10 mg   Dose: 10 mg  Freq: Every 24 Hours Scheduled Route: PO  Start: 10/19/20 0900 End: 10/18/20 1417    Admin Instructions:   Hold if systolic BP less than 105  Caution: Look alike/sound alike drug alert. Avoid grapefruit juice.          amLODIPine (NORVASC) tablet 10 mg   Dose: 10 mg  Freq: Every 24 Hours Scheduled Route: PO  Start: 10/16/20 1200 End: 10/18/20 1235    Admin Instructions:   Hold if systolic BP less than 105  Caution: Look alike/sound alike drug alert. Avoid grapefruit juice.         amLODIPine (NORVASC) tablet 5 mg   Dose: 5 mg  Freq: Every 24 Hours Scheduled Route: PO  Start: 10/19/20 0900 End: 10/21/20 1501    Admin Instructions:   Hold if systolic BP less than 105  Caution: Look alike/sound alike drug alert. Avoid grapefruit juice.    0813 0800   1501-D/C'd         amLODIPine (NORVASC) tablet 5 mg   Dose: 5 mg  Freq: Every 24 Hours Scheduled Route: PO  Start: 10/19/20 0900 End: 10/18/20 1235    Admin Instructions:   Hold if systolic BP less than 105  Caution: Look alike/sound alike drug alert. Avoid grapefruit juice.         dexamethasone (DECADRON) injection 6 mg   Dose: 6 mg  Freq: Once Route: IV  Start: 10/15/20 1915 End: 10/15/20 1929    Admin Instructions:   For IV administration. May be pushed over a minimum of 1 minute.         dexamethasone (DECADRON) tablet 6 mg   Dose: 6 mg  Freq: Daily With Breakfast Route: PO  Start: 10/16/20 0900 End: 10/17/20 2324    Admin Instructions:   Take with food.         dexamethasone sodium phosphate injection 6 mg   Dose: 6 mg  Freq: Every 12 Hours Route: IV  Start: 10/17/20 2324    Admin Instructions:   For IV administration. May be pushed over a minimum of 1 minute.    1256          0104   1220   2351      1238            enoxaparin (LOVENOX) syringe 40 mg   Dose: 40 mg  Freq: Every 24 Hours Route: SC  Indications of Use: PROPHYLAXIS OF VENOUS THROMBOEMBOLISM  Start: 10/16/20 0900    Admin Instructions:   Give subcutaneous in abdomen only. Do not massage site after injection.    0814          0803         0857            famotidine (PEPCID) tablet 20 mg   Dose: 20 mg  Freq: 2 Times Daily Before Meals Route: PO  Start: 10/16/20 0900    0703   1738        0637   1736         0856   1730          insulin glargine (LANTUS) injection 10 Units   Dose: 10 Units  Freq: Once Route: SC  Start: 10/22/20 1130 End: 10/22/20 1230    Admin Instructions:         1230            insulin glargine (LANTUS) injection 15 Units   Dose: 15 Units  Freq: Nightly Route: SC  Start: 10/19/20 1800 End: 10/20/20 1501    Admin Instructions:       1501-D/C'd            insulin glargine (LANTUS) injection 20 Units   Dose: 20 Units  Freq: Nightly Route: SC  Start: 10/20/20 2100 End: 10/21/20 1420    Admin Instructions:       2131          1420-D/C'd           insulin glargine (LANTUS) injection 24 Units   Dose: 24 Units  Freq: Nightly Route: SC  Start: 10/21/20 2100 End: 10/22/20 1041    Admin Instructions:        2100          1041-D/C'd          insulin glargine (LANTUS) injection 30 Units   Dose: 30 Units  Freq: Nightly Route: SC  Start: 10/22/20 2100    Admin Instructions:         2100            insulin lispro (humaLOG) injection 0-24 Units   Dose: 0-24 Units  Freq: 3 Times Daily Before Meals Route: SC  Start: 10/19/20 1800    Admin Instructions:   Correction - High Dose.  Greater than 80 units/day total insulin dose or, on steroids, insulin resistant, infection.    Blood glucose 150-199 mg/dL - 4 units  Blood glucose 200-249 mg/dL - 8 units  Blood glucose 250-299 mg/dL - 12 units  Blood glucose 300-349 mg/dL - 16 units  Blood glucose 350-400 mg/dL - 20 units  Blood glucose greater than 400 mg/dL - 24 units and call provider   Caution: Look alike/sound alike drug alert    0703   1252   1737      0758   1219   1736      0857   1238   1730        insulin lispro (humaLOG) injection 0-9 Units   Dose: 0-9 Units  Freq: 4 Times Daily With Meals & Nightly Route: SC  Start: 10/18/20 2200 End: 10/19/20 1701    Admin Instructions:   Correction - Moderate Dose.  40-60 units/day total insulin dose or average weight, on oral agents    Blood glucose 150-199 mg/dL - 2 units  Blood glucose 200-249 mg/dL - 4 units  Blood glucose  250-299 mg/dL - 6 units  Blood glucose 300-349 mg/dL - 7 units  Blood glucose 350-400 mg/dL - 8 units  Blood glucose greater than 400 mg/dL - 9 units and call provider   Caution: Look alike/sound alike drug alert         insulin lispro (humaLOG) injection 3 Units   Dose: 3 Units  Freq: 3 Times Daily With Meals Route: SC  Start: 10/22/20 1200    Admin Instructions:    Caution: Look alike/sound alike drug alert      1238   1800          insulin lispro (humaLOG) injection 3 Units   Dose: 3 Units  Freq: Once Route: SC  Start: 10/19/20 1150 End: 10/19/20 1207    Admin Instructions:    Caution: Look alike/sound alike drug alert         INV [id number] remdesivir 200 mg in sodium chloride 0.9 % 250 mL IVPB (powder vial)   Dose: 200 mg  Freq: Every 24 Hours Route: IV  Start: 10/16/20 1330 End: 10/16/20 1631    Admin Instructions:   Ensure all of the drug is administered. Flush line with 30mL NS after administration.         Followed by  INV [id number] remdesivir 100 mg in sodium chloride 0.9 % 250 mL IVPB (powder vial)   Dose: 100 mg  Freq: Every 24 Hours Route: IV  Start: 10/17/20 1300 End: 10/20/20 1409    Admin Instructions:   Ensure all of the drug is administered. Flush line with 30mL NS after administration.    1309              losartan (COZAAR) tablet 100 mg   Dose: 100 mg  Freq: Nightly Route: PO  Start: 10/16/20 2100    Admin Instructions:   Hold if systolic BP less than 110     0103   2012 2100            perflutren (DEFINITY) 8.476 mg in sodium chloride 0.9 % 10 mL injection   Dose: 1.5 mL  Freq: Once Route: IV  Start: 10/21/20 1115 End: 10/21/20 1000    Admin Instructions:   Mix 1.3 mL of mechanically activated Definity with 8.7 mL of normal saline. A total of 1.5 mL of the resulting Definity solution was administered.     1000             potassium chloride (MICRO-K) CR capsule 40 mEq   Dose: 40 mEq  Freq: Once Route: PO  Start: 10/15/20 1855 End: 10/15/20 1908    Admin Instructions:   Do not crush. Take with  food.         sodium chloride 0.9 % flush 10 mL   Dose: 10 mL  Freq: Every 12 Hours Scheduled Route: IV  Start: 10/15/20 2100    0826   2130        0804   2013        0857   2100          tamsulosin (FLOMAX) 24 hr capsule 0.4 mg   Dose: 0.4 mg  Freq: Nightly Route: PO  Start: 10/16/20 2100    Admin Instructions:   Swallow whole. Do not crush or chew.    2127 2012 2100            Medications 10/20/20 10/21/20 10/22/20       Continuous Meds Sorted by Name  for Donta Maria as of 10/20/20 through 10/22/20   Legend:                          Inactive     Active     Other Encounter     Linked                 Medications 10/20/20 10/21/20 10/22/20   Pharmacy Consult - Remdesivir   Freq: Continuous PRN Route: XX  PRN Reason: Consult  PRN Comment: Review of EUA Requirements for Remdesivir  Start: 10/16/20 1243 End: 10/21/20 1011     1011-D/C'd               PRN Meds Sorted by Name  for Donta Maria as of 10/20/20 through 10/22/20   Legend:                          Inactive     Active     Other Encounter     Linked                 Medications 10/20/20 10/21/20 10/22/20   acetaminophen (TYLENOL) tablet 650 mg   Dose: 650 mg  Freq: Every 4 Hours PRN Route: PO  PRN Reason: Mild Pain   Start: 10/15/20 1958    Admin Instructions:   Do not exceed 4 grams of acetaminophen in a 24 hr period.    If given for pain, use the following pain scale:   Mild Pain = Pain Score of 1-3, CPOT 1-2  Moderate Pain = Pain Score of 4-6, CPOT 3-4  Severe Pain = Pain Score of 7-10, CPOT 5-8  Do not exceed 4 grams of acetaminophen in a 24 hr period.    If given for pain, use the following pain scale:   Mild Pain = Pain Score of 1-3, CPOT 1-2  Moderate Pain = Pain Score of 4-6, CPOT 3-4  Severe Pain = Pain Score of 7-10, CPOT 5-8         Or  acetaminophen (TYLENOL) 160 MG/5ML solution 650 mg   Dose: 650 mg  Freq: Every 4 Hours PRN Route: PO  PRN Reason: Mild Pain   Start: 10/15/20 1958    Admin Instructions:   Do not exceed 4 grams of  acetaminophen in a 24 hr period.    If given for pain, use the following pain scale:   Mild Pain = Pain Score of 1-3, CPOT 1-2  Moderate Pain = Pain Score of 4-6, CPOT 3-4  Severe Pain = Pain Score of 7-10, CPOT 5-8  Do not exceed 4 grams of acetaminophen in a 24 hr period.    If given for pain, use the following pain scale:   Mild Pain = Pain Score of 1-3, CPOT 1-2  Moderate Pain = Pain Score of 4-6, CPOT 3-4  Severe Pain = Pain Score of 7-10, CPOT 5-8         Or  acetaminophen (TYLENOL) suppository 650 mg   Dose: 650 mg  Freq: Every 4 Hours PRN Route: RE  PRN Reason: Mild Pain   Start: 10/15/20 1958    Admin Instructions:   Do not exceed 4 grams of acetaminophen in a 24 hr period.    If given for pain, use the following pain scale:   Mild Pain = Pain Score of 1-3, CPOT 1-2  Moderate Pain = Pain Score of 4-6, CPOT 3-4  Severe Pain = Pain Score of 7-10, CPOT 5-8         dextrose (D50W) 25 g/ 50mL Intravenous Solution 25 g   Dose: 25 g  Freq: Every 15 Minutes PRN Route: IV  PRN Reason: Low Blood Sugar  PRN Comment: Blood Sugar Less Than 70  Start: 10/18/20 2101    Admin Instructions:   Blood sugar less than 70; patient has IV access - Unresponsive, NPO or Unable To Safely Swallow         dextrose (GLUTOSE) oral gel 15 g   Dose: 15 g  Freq: Every 15 Minutes PRN Route: PO  PRN Reason: Low Blood Sugar  PRN Comment: Blood sugar less than 70  Start: 10/18/20 2101    Admin Instructions:   BS<70, Patient Alert, Is not NPO, Can safely swallow.         glucagon (human recombinant) (GLUCAGEN DIAGNOSTIC) injection 1 mg   Dose: 1 mg  Freq: Every 15 Minutes PRN Route: SC  PRN Reason: Low Blood Sugar  PRN Comment: Blood Glucose Less Than 70  Start: 10/18/20 2101    Admin Instructions:   Blood Glucose Less Than 70 - Patient Without IV Access - Unresponsive, NPO or Unable To Safely Swallow         melatonin tablet 3 mg   Dose: 3 mg  Freq: Nightly PRN Route: PO  PRN Reason: Sleep  Start: 10/17/20 1242         nitroglycerin  (NITROSTAT) SL tablet 0.4 mg   Dose: 0.4 mg  Freq: Every 5 Minutes PRN Route: SL  PRN Reason: Chest Pain  PRN Comment: Only if SBP Greater Than 100  Start: 10/15/20 1958    Admin Instructions:   If Pain Unrelieved After 3 Doses Notify MD         ondansetron (ZOFRAN) injection 4 mg   Dose: 4 mg  Freq: Every 6 Hours PRN Route: IV  PRN Reasons: Nausea,Vomiting  Start: 10/15/20 1959    Admin Instructions:   If BOTH ondansetron (ZOFRAN) and promethazine (PHENERGAN) are ordered use ondansetron first and THEN promethazine IF ondansetron is ineffective.         Pharmacy Consult - Remdesivir   Freq: Continuous PRN Route: XX  PRN Reason: Consult  PRN Comment: Review of EUA Requirements for Remdesivir  Start: 10/16/20 1243 End: 10/21/20 1011     1011-D/C'd           potassium chloride (K-DUR,KLOR-CON) ER tablet 40 mEq   Dose: 40 mEq  Freq: As Needed Route: PO  PRN Comment: Potassium Replacement.  See Admin Instructions  Start: 10/19/20 0911 End: 10/19/20 0912    Admin Instructions:   Potassium 3.1 or Less Give KCl 40 mEq q4h x3 Doses   Potassium 3.2 - 3.6 Give KCl 40 mEq q4h x2 Doses     Check Potassium 4 Hours After Last Dose Given   Check Magnesium if Potassium Level Remains Low After Replacement   DO NOT GIVE if CrCl is Less Than 30 mL/minute or Urine Output Less Than 30 mL/hr  Swallow whole; do not crush, split, or chew.         Or  potassium chloride (KLOR-CON) packet 40 mEq   Dose: 40 mEq  Freq: As Needed Route: PO  PRN Comment: potassium replacement, see admin instructions  Start: 10/19/20 0911 End: 10/19/20 0912    Admin Instructions:   Potassium 3.1 or Less Give KCl 40 mEq q4h x3 Doses   Potassium 3.2 - 3.6 Give KCl 40 mEq q4h x2 Doses     Check Potassium 4 Hours After Last Dose Given   Check Magnesium if Potassium Level Remains Low After Replacement   DO NOT GIVE if CrCl is Less Than 30 mL/minute or Urine Output Less Than 30 mL/hr         Or  potassium chloride 10 mEq in 100 mL IVPB   Dose: 10 mEq  Freq: Every 1 Hour  PRN Route: IV  PRN Comment: Potassium Replacement - See Admin Instructions  Start: 10/19/20 0911 End: 10/19/20 0912    Admin Instructions:   Peripheral or Central IV  Potassium 3.1 or Less Give KCl 10 mEq/100 mL NS IV q1h x6 Doses  Potassium 3.2 - 3.6 Give KCl 10 mEq/100 mL NS q1h x4 Doses    Check Potassium 4 Hours After Last Dose Given  Check Magnesium if Potassium Remains Low After Replacement  DO NOT GIVE if CrCl is Less Than 30 mL/minute or Urine Output Less Than 30 mL/hr.     Rates Greater Than 10 mEq/hr Require ECG Monitoring.  OUTPATIENT/NON-MONITORED UNITS: Potassium Chloride standard bolus infusion rate is a maximum of 10 mEq/hr on unmonitored patients    MONITORED UNITS: Potassium Chloride standard bolus infusion rate is a maximum of 20 mEq/hr on ECG monitored patients ONLY         sodium chloride 0.9 % flush 10 mL   Dose: 10 mL  Freq: As Needed Route: IV  PRN Reason: Line Care  Start: 10/15/20 1958         sodium chloride 0.9 % flush 10 mL   Dose: 10 mL  Freq: As Needed Route: IV  PRN Reason: Line Care  Start: 10/15/20 1723         Medications 10/20/20 10/21/20 10/22/20

## 2020-10-22 NOTE — PROGRESS NOTES
[facility]  [phone]  Jayson Damon MD    Subjective:  LOS: 6    Remains on HF but overall better. No n/v and denies sig cough or phlegm. Mostly just feels fatigued.    Objective   Vital Signs past 24hrs    Temp range: Temp (24hrs), Av.9 °F (36.1 °C), Min:96.5 °F (35.8 °C), Max:97.1 °F (36.2 °C)    BP range: BP: (127-137)/(77-81) 137/78  Pulse range: Heart Rate:  [43-53] 53  Resp rate range: Resp:  [16-18] 16    Device (Oxygen Therapy): high-flow nasal cannulaFlow (L/min):  [6-7] 6  Oxygen range:SpO2:  [91 %-96 %] 93 %      93.9 kg (207 lb); Body mass index is 28.87 kg/m².    Intake/Output Summary (Last 24 hours) at 10/22/2020 1337  Last data filed at 10/21/2020 1700  Gross per 24 hour   Intake 120 ml   Output --   Net 120 ml       Physical Exam  Constitutional:       Appearance: Normal appearance.   HENT:      Head: Normocephalic.      Mouth/Throat:      Mouth: Mucous membranes are moist.   Eyes:      Conjunctiva/sclera: Conjunctivae normal.      Pupils: Pupils are equal, round, and reactive to light.   Cardiovascular:      Rate and Rhythm: Regular rhythm. Bradycardia present.      Heart sounds: Normal heart sounds. No murmur.   Pulmonary:      Breath sounds: Rales (few scattered) present.   Abdominal:      General: Bowel sounds are normal. There is no distension.      Palpations: Abdomen is soft. There is no mass.      Tenderness: There is no abdominal tenderness.   Musculoskeletal:         General: No swelling.   Neurological:      Mental Status: He is alert.       Results Review:    I have reviewed the laboratory and imaging data since the last note by [facility] physician.  My annotations are noted in assessment and plan.    COVID LABS:  Results From Last 14 Days   Lab Units 10/19/20  0834 10/18/20  0945 10/17/20  0640 10/16/20  1325 10/15/20  1928 10/15/20  1808   CK TOTAL U/L  --   --  68  --   --   --    CRP mg/dL  --  3.96* 3.22* 5.86*  --   --    D DIMER QUANT MCGFEU/mL  --  0.66* 0.90*  --   0.63*  --    LACTATE mmol/L  --   --   --   --   --  1.2   LDH U/L  --   --  423*  --   --   --    PROCALCITONIN ng/mL  --  <0.02  --   --   --  0.06   TROPONIN T ng/mL <0.010  --   --   --   --   --          Medication Review:  I have reviewed the current MAR.  My annotations are noted in assessment and plan.       Plan   PCCM Problems  AHRF  Covid-19 infection  Bilateral infiltrates, R>L  Bradycardia  DM2      Plan of Treatment  Ongoing supportive treatment for COVID-19 infection.  Now on dexa 6 mg iv bid and finished Remdesivir. Currently on high flow cannula.  Continue to wean oxygen. Appears better.  Will try on regular nasal cannula. Check Covid labs.    Note cardiology following for bradycardia.  Continues to have a heart rate in the 40s.    Appears to be better.      Jayson Damon MD  10/22/20  13:37 EDT    While in the room and during my examination of the patient I wore gloves, gown, mask, eye protection and followed enhanced droplet/contact isolation protocol and precautions.  I washed my hands before and after this patient encounter.    Communication:  Please USE SECURE CHAT TO MESSAGE ME in EPIC on In-Patient Care of this patient.     Part of this note may be an electronic transcription/translation of spoken language to printed text using the Dragon Dictation System.

## 2020-10-22 NOTE — PROGRESS NOTES
Name: Donta Maria ADMIT: 10/15/2020   : 1955  PCP: Nena Maxwell MD    MRN: [id number] LOS: 6 days   AGE/SEX: 65 y.o. male  ROOM: [facility]   Subjective   Chief Complaint   Patient presents with   • COVID +   • Shortness of Breath      6L oxygen  +dyspnea  On steroids  BG very high- upper 300s  On insulin only with partial improvement  Low HR at times- no symptoms    ROS  No f/c  No n/v  No cp/palp  +soa/cough    Objective   Vital Signs  Temp:  [96.5 °F (35.8 °C)-97.1 °F (36.2 °C)] 96.8 °F (36 °C)  Heart Rate:  [43-53] 53  Resp:  [16-18] 16  BP: (127-137)/(77-81) 137/78  SpO2:  [91 %-96 %] 93 %  on  Flow (L/min):  [6-7] 6;   Device (Oxygen Therapy): high-flow nasal cannula  Body mass index is 28.87 kg/m².    More limited exam done as pulmonary following    Physical Exam  Constitutional:       Appearance: He is well-developed. He is ill-appearing.   HENT:      Head: Normocephalic and atraumatic.   Eyes:      General: No scleral icterus.  Neck:      Musculoskeletal: Neck supple.   Cardiovascular:      Rate and Rhythm: Regular rhythm. Bradycardia present.   Pulmonary:      Effort: Respiratory distress present.      Breath sounds: No wheezing.   Abdominal:      General: There is no distension.      Palpations: Abdomen is soft.      Tenderness: There is no abdominal tenderness.   Neurological:      Mental Status: He is alert.   Psychiatric:         Behavior: Behavior normal.         Results Review:       I reviewed the patient's new clinical results.  Results from last 7 days   Lab Units 10/19/20  0834 10/18/20  0945 10/16/20  0745 10/15/20  1808   WBC 10*3/mm3 5.38 5.64 3.80 4.87   HEMOGLOBIN g/dL 14.6 14.2 14.8 15.2   PLATELETS 10*3/mm3 198 177 143 144     Results from last 7 days   Lab Units 10/20/20  0742 10/19/20  0834 10/18/20  0945 10/17/20  0640  10/15/20  1808   SODIUM mmol/L 139 140  --  137  --  135*   POTASSIUM mmol/L 4.2 4.4  --  3.8  --  3.2*   CHLORIDE mmol/L 101 100  --  102  --  99   CO2  mmol/L 27.6 29.5*  --  26.6  --  24.2   BUN mg/dL 18 18  --  17  --  14   CREATININE mg/dL 0.84 0.72* 0.86 0.84   < > 0.91   GLUCOSE mg/dL 292* 308*  --  300*  --  276*    < > = values in this interval not displayed.   Estimated Creatinine Clearance: 101.2 mL/min (by C-G formula based on SCr of 0.84 mg/dL).  Results from last 7 days   Lab Units 10/20/20  0742 10/19/20  0834 10/18/20  0945 10/17/20  0640   ALBUMIN g/dL 3.30* 3.70 3.40* 3.30*   BILIRUBIN mg/dL 0.3 0.4 0.4 0.2   ALK PHOS U/L 69 76 78 72   AST (SGOT) U/L 24 24 26 33   ALT (SGPT) U/L 23 26 28 28     Results from last 7 days   Lab Units 10/20/20  0742 10/19/20  0834 10/18/20  0945 10/17/20  0640  10/15/20  1808   CALCIUM mg/dL 8.7 9.2  --  8.7  --  9.2   ALBUMIN g/dL 3.30* 3.70 3.40* 3.30*   < > 3.90   MAGNESIUM mg/dL  --   --   --  2.1  --   --     < > = values in this interval not displayed.     Results from last 7 days   Lab Units 10/18/20  0945 10/15/20  1808   PROCALCITONIN ng/mL <0.02 0.06   LACTATE mmol/L  --  1.2       Coag     HbA1C   Lab Results   Component Value Date    HGBA1C 11.20 (H) 10/19/2020    HGBA1C 8.3 (H) 01/28/2020     Infection   Results from last 7 days   Lab Units 10/18/20  0945 10/15/20  1808   BLOODCX   --  No growth at 5 days  No growth at 5 days   PROCALCITONIN ng/mL <0.02 0.06     Radiology(recent) No radiology results for the last day  No results found for: TROPONINT, TROPONINI, BNP  No components found for: TSH;2    dexamethasone, 6 mg, Intravenous, Q12H  enoxaparin, 40 mg, Subcutaneous, Q24H  famotidine, 20 mg, Oral, BID AC  insulin glargine, 30 Units, Subcutaneous, Nightly  insulin lispro, 0-24 Units, Subcutaneous, TID AC  insulin lispro, 3 Units, Subcutaneous, TID With Meals  losartan, 100 mg, Oral, Nightly  sodium chloride, 10 mL, Intravenous, Q12H  tamsulosin, 0.4 mg, Oral, Nightly       Diet Regular; Consistent Carbohydrate      Assessment/Plan      Active Hospital Problems    Diagnosis  POA   • **Pneumonia due to  COVID-19 virus [U07.1, J12.89]  Yes   • Type 2 diabetes mellitus with hyperglycemia (CMS/[facility]) [E11.65]  Yes   • Sepsis due to pneumonia (CMS/[facility]) [J18.9, A41.9]  Yes   • Viral pneumonia [J12.9]  Yes   • Hypokalemia [E87.6]  Yes   • Hyponatremia [E87.1]  Yes   • LFTs abnormal [R94.5]  Yes   • Acute respiratory failure with hypoxia (CMS/[facility]) [J96.01]  Yes      Resolved Hospital Problems   No resolved problems to display.       · Supportive care with dexamethasone, remdesivir  · High oxygen requirements, monitor closely. A little better over the past 2 days  · Increase lantus again on 10/22, increased SSI, add scheduled insulin with meals as well, monitor BG closely  · Monitor labs  · lovenox for dvt proph  · Asymptomatic bradycardia- but significant at times- Cardiology following closely    Thanks to specialists who are following    [facility]  Reviewed records    Riley Chang MD  [facility]  10/22/20  10:43 EDT

## 2020-10-22 NOTE — PLAN OF CARE
Problem: Adult Inpatient Plan of Care  Goal: Plan of Care Review  Outcome: Ongoing, Progressing  Flowsheets (Taken 10/22/2020 1454)  Progress: improving  Plan of Care Reviewed With: patient  Outcome Summary: Patient has been pleasant and coopeative during shift. Currently on 6L O2 with a normal nasal cannula per pulmonology. No complaints of pain or nausea. Patient SOA. Patient ad pedro in room. Patient receiving decadron IV. Patient is bradycardic and cardiology is aware. Humalog and lantus increased. Will continue to monitor and assist patient ass needed.  Goal: Patient-Specific Goal (Individualized)  Outcome: Ongoing, Progressing  Goal: Absence of Hospital-Acquired Illness or Injury  Outcome: Ongoing, Progressing  Intervention: Identify and Manage Fall Risk  Recent Flowsheet Documentation  Taken 10/22/2020 1345 by Last Spencer RN  Safety Promotion/Fall Prevention:   assistive device/personal items within reach   fall prevention program maintained   nonskid shoes/slippers when out of bed   safety round/check completed  Taken 10/22/2020 1235 by Last Spencer RN  Safety Promotion/Fall Prevention:   assistive device/personal items within reach   fall prevention program maintained   nonskid shoes/slippers when out of bed   safety round/check completed  Taken 10/22/2020 0951 by Last Spencer RN  Safety Promotion/Fall Prevention:   assistive device/personal items within reach   fall prevention program maintained   nonskid shoes/slippers when out of bed   safety round/check completed  Taken 10/22/2020 0858 by Last Spencer RN  Safety Promotion/Fall Prevention:   assistive device/personal items within reach   fall prevention program maintained   nonskid shoes/slippers when out of bed   safety round/check completed  Intervention: Prevent Skin Injury  Recent Flowsheet Documentation  Taken 10/22/2020 1345 by Last Spencer RN  Body Position: supine  Taken 10/22/2020 1235 by Last Spencer RN  Body  Position:   supine   position changed independently  Taken 10/22/2020 0951 by Last Spencer RN  Body Position: supine  Taken 10/22/2020 0858 by Last Spencer RN  Body Position: supine  Intervention: Prevent Infection  Recent Flowsheet Documentation  Taken 10/22/2020 1345 by Last Spencer RN  Infection Prevention: personal protective equipment utilized  Taken 10/22/2020 1235 by Last Spencer RN  Infection Prevention: personal protective equipment utilized  Taken 10/22/2020 0951 by Last Spencer RN  Infection Prevention: personal protective equipment utilized  Taken 10/22/2020 0858 by Last Spencer RN  Infection Prevention: personal protective equipment utilized  Goal: Optimal Comfort and Wellbeing  Outcome: Ongoing, Progressing  Goal: Readiness for Transition of Care  Outcome: Ongoing, Progressing     Problem: Skin Injury Risk Increased  Goal: Skin Health and Integrity  Outcome: Ongoing, Progressing  Intervention: Optimize Skin Protection  Recent Flowsheet Documentation  Taken 10/22/2020 1345 by Last Spencer RN  Pressure Reduction Techniques:   frequent weight shift encouraged   weight shift assistance provided  Head of Bed (HOB): HOB at 30-45 degrees  Pressure Reduction Devices: pressure-redistributing mattress utilized  Skin Protection:   tubing/devices free from skin contact   incontinence pads utilized  Taken 10/22/2020 1235 by Last Spencer RN  Head of Bed (HOB): HOB at 30-45 degrees  Taken 10/22/2020 0951 by Last Spencer RN  Head of Bed (HOB): HOB at 30-45 degrees  Taken 10/22/2020 0858 by Last Spencer RN  Pressure Reduction Techniques: frequent weight shift encouraged  Head of Bed (HOB): HOB at 30-45 degrees  Pressure Reduction Devices: pressure-redistributing mattress utilized  Skin Protection: tubing/devices free from skin contact     Problem: Fall Injury Risk  Goal: Absence of Fall and Fall-Related Injury  Outcome: Ongoing, Progressing  Intervention: Promote  Injury-Free Environment  Recent Flowsheet Documentation  Taken 10/22/2020 1345 by Last Spencer RN  Safety Promotion/Fall Prevention:   assistive device/personal items within reach   fall prevention program maintained   nonskid shoes/slippers when out of bed   safety round/check completed  Taken 10/22/2020 1235 by Last Spencer RN  Safety Promotion/Fall Prevention:   assistive device/personal items within reach   fall prevention program maintained   nonskid shoes/slippers when out of bed   safety round/check completed  Taken 10/22/2020 0951 by Last Spencer RN  Safety Promotion/Fall Prevention:   assistive device/personal items within reach   fall prevention program maintained   nonskid shoes/slippers when out of bed   safety round/check completed  Taken 10/22/2020 0858 by Last Spencer RN  Safety Promotion/Fall Prevention:   assistive device/personal items within reach   fall prevention program maintained   nonskid shoes/slippers when out of bed   safety round/check completed   Goal Outcome Evaluation:  Plan of Care Reviewed With: patient  Progress: improving  Outcome Summary: Patient has been pleasant and coopeative during shift. Currently on 6L O2 with a normal nasal cannula per pulmonology. No complaints of pain or nausea. Patient SOA. Patient ad pedro in room. Patient receiving decadron IV. Patient is bradycardic and cardiology is aware. Humalog and lantus increased. Will continue to monitor and assist patient ass needed.

## 2020-10-22 NOTE — PROGRESS NOTES
[facility]  Cardiology Progress Note    Patient Identification:  Name: Donta Maria  Age: 65 y.o.  Sex: male  :  1955  MRN: [id number]                 Follow Up / Chief Complaint: Bradycardia    Interval History: Asymptomatic for bradycardia      Subjective:    CDC requirements for Covid    Objective:    Past Medical History:  Past Medical History:   Diagnosis Date   • Diabetes mellitus (CMS/HCC)    • Hypertension    • Rheumatoid arthritis (CMS/HCC)    • Rheumatoid arthritis (CMS/HCC)      Past Surgical History:  History reviewed. No pertinent surgical history.     Social History:   Social History     Tobacco Use   • Smoking status: Former Smoker     Types: Cigarettes     Quit date: 11/15/2008     Years since quittin.9   • Smokeless tobacco: Former User   Substance Use Topics   • Alcohol use: Yes     Comment: rarely      Family History:  History reviewed. No pertinent family history.       Allergies:  Allergies   Allergen Reactions   • Lisinopril Other (See Comments)     Dry cough     Scheduled Meds:  dexamethasone, 6 mg, Q12H  enoxaparin, 40 mg, Q24H  famotidine, 20 mg, BID AC  insulin glargine, 30 Units, Nightly  insulin lispro, 0-24 Units, TID AC  insulin lispro, 3 Units, TID With Meals  losartan, 100 mg, Nightly  sodium chloride, 10 mL, Q12H  tamsulosin, 0.4 mg, Nightly            INTAKE AND OUTPUT:  No intake or output data in the 24 hours ending 10/22/20 1718    Review of Systems: CDC requirements for Covid    GI:  Cardiac:  Pulmonary:    Constitutional:  Temp:  [96.8 °F (36 °C)-97.9 °F (36.6 °C)] 97.9 °F (36.6 °C)  Heart Rate:  [43-53] 50  Resp:  [16] 16  BP: (127-145)/(78-81) 145/78    Physical Exam:     CDC requirements for Covid        Cardiographics  Telemetry:                     ECG:      Interpretation Summary    · Left ventricular ejection fraction appears to be 51 - 55%.  · Left ventricular ejection fraction appears to be 51 - 55%  · There is no evidence of pericardial  "effusion.         Lab Review   Results from last 7 days   Lab Units 10/19/20  0834 10/17/20  0640   CK TOTAL U/L  --  68   TROPONIN T ng/mL <0.010  --      Results from last 7 days   Lab Units 10/17/20  0640   MAGNESIUM mg/dL 2.1     Results from last 7 days   Lab Units 10/20/20  0742   SODIUM mmol/L 139   POTASSIUM mmol/L 4.2   BUN mg/dL 18   CREATININE mg/dL 0.84   CALCIUM mg/dL 8.7        Results from last 7 days   Lab Units 10/19/20  0834 10/18/20  0945 10/16/20  0745   WBC 10*3/mm3 5.38 5.64 3.80   HEMOGLOBIN g/dL 14.6 14.2 14.8   HEMATOCRIT % 43.9 42.2 42.6   PLATELETS 10*3/mm3 198 177 143         The following medical decision was discussed in detail with Dr. Jay    Assessment:  Bradycardia asymptomatic  Pause noted 4.0, as above  Acute respiratory failure with hypoxia viral pneumonia pneumonia due to COVID-19 virus  Hypokalemia   hyponatremia  Elevated LFTs  diabetes mellitus   sepsis due to pneumonia        Plan:  Patient continues to have the bradycardia but no more pauses continue conservative management oxygenation is much better  Nena Jay MD          )10/22/2020       EMR Dragon/Transcription:   \"Dictated utilizing Dragon dictation\".     "

## 2020-10-23 LAB
ALBUMIN SERPL-MCNC: 3.4 G/DL (ref 3.5–5.2)
ALBUMIN/GLOB SERPL: 1.7 G/DL
ALP SERPL-CCNC: 63 U/L (ref 39–117)
ALT SERPL W P-5'-P-CCNC: 31 U/L (ref 1–41)
ANION GAP SERPL CALCULATED.3IONS-SCNC: 7.2 MMOL/L (ref 5–15)
AST SERPL-CCNC: 25 U/L (ref 1–40)
BASOPHILS # BLD AUTO: 0.02 10*3/MM3 (ref 0–0.2)
BASOPHILS NFR BLD AUTO: 0.3 % (ref 0–1.5)
BILIRUB SERPL-MCNC: 0.3 MG/DL (ref 0–1.2)
BUN SERPL-MCNC: 14 MG/DL (ref 8–23)
BUN/CREAT SERPL: 25 (ref 7–25)
CALCIUM SPEC-SCNC: 8.7 MG/DL (ref 8.6–10.5)
CHLORIDE SERPL-SCNC: 99 MMOL/L (ref 98–107)
CK SERPL-CCNC: 42 U/L (ref 20–200)
CO2 SERPL-SCNC: 28.8 MMOL/L (ref 22–29)
CREAT SERPL-MCNC: 0.56 MG/DL (ref 0.76–1.27)
CRP SERPL-MCNC: 0.27 MG/DL (ref 0–0.5)
D DIMER PPP FEU-MCNC: 0.7 MCGFEU/ML (ref 0–0.49)
DEPRECATED RDW RBC AUTO: 40.2 FL (ref 37–54)
EOSINOPHIL # BLD AUTO: 0 10*3/MM3 (ref 0–0.4)
EOSINOPHIL NFR BLD AUTO: 0 % (ref 0.3–6.2)
ERYTHROCYTE [DISTWIDTH] IN BLOOD BY AUTOMATED COUNT: 13 % (ref 12.3–15.4)
FERRITIN SERPL-MCNC: 626 NG/ML (ref 30–400)
GFR SERPL CREATININE-BSD FRML MDRD: 146 ML/MIN/1.73
GLOBULIN UR ELPH-MCNC: 2 GM/DL
GLUCOSE BLDC GLUCOMTR-MCNC: 304 MG/DL (ref 70–130)
GLUCOSE BLDC GLUCOMTR-MCNC: 324 MG/DL (ref 70–130)
GLUCOSE BLDC GLUCOMTR-MCNC: 343 MG/DL (ref 70–130)
GLUCOSE BLDC GLUCOMTR-MCNC: 374 MG/DL (ref 70–130)
GLUCOSE SERPL-MCNC: 295 MG/DL (ref 65–99)
HCT VFR BLD AUTO: 41.8 % (ref 37.5–51)
HGB BLD-MCNC: 14.4 G/DL (ref 13–17.7)
IMM GRANULOCYTES # BLD AUTO: 0.25 10*3/MM3 (ref 0–0.05)
IMM GRANULOCYTES NFR BLD AUTO: 3.6 % (ref 0–0.5)
LDH SERPL-CCNC: 293 U/L (ref 135–225)
LYMPHOCYTES # BLD AUTO: 0.93 10*3/MM3 (ref 0.7–3.1)
LYMPHOCYTES NFR BLD AUTO: 13.3 % (ref 19.6–45.3)
MCH RBC QN AUTO: 29.8 PG (ref 26.6–33)
MCHC RBC AUTO-ENTMCNC: 34.4 G/DL (ref 31.5–35.7)
MCV RBC AUTO: 86.4 FL (ref 79–97)
MONOCYTES # BLD AUTO: 0.51 10*3/MM3 (ref 0.1–0.9)
MONOCYTES NFR BLD AUTO: 7.3 % (ref 5–12)
NEUTROPHILS NFR BLD AUTO: 5.27 10*3/MM3 (ref 1.7–7)
NEUTROPHILS NFR BLD AUTO: 75.5 % (ref 42.7–76)
NRBC BLD AUTO-RTO: 0 /100 WBC (ref 0–0.2)
PLATELET # BLD AUTO: 173 10*3/MM3 (ref 140–450)
PMV BLD AUTO: 11.7 FL (ref 6–12)
POTASSIUM SERPL-SCNC: 4.1 MMOL/L (ref 3.5–5.2)
PROT SERPL-MCNC: 5.4 G/DL (ref 6–8.5)
RBC # BLD AUTO: 4.84 10*6/MM3 (ref 4.14–5.8)
SODIUM SERPL-SCNC: 135 MMOL/L (ref 136–145)
WBC # BLD AUTO: 6.98 10*3/MM3 (ref 3.4–10.8)

## 2020-10-23 PROCEDURE — 86140 C-REACTIVE PROTEIN: CPT | Performed by: INTERNAL MEDICINE

## 2020-10-23 PROCEDURE — 63710000001 INSULIN LISPRO (HUMAN) PER 5 UNITS: Performed by: INTERNAL MEDICINE

## 2020-10-23 PROCEDURE — 82962 GLUCOSE BLOOD TEST: CPT

## 2020-10-23 PROCEDURE — 82550 ASSAY OF CK (CPK): CPT | Performed by: INTERNAL MEDICINE

## 2020-10-23 PROCEDURE — 63710000001 DEXAMETHASONE PER 0.25 MG: Performed by: INTERNAL MEDICINE

## 2020-10-23 PROCEDURE — 83615 LACTATE (LD) (LDH) ENZYME: CPT | Performed by: INTERNAL MEDICINE

## 2020-10-23 PROCEDURE — 80053 COMPREHEN METABOLIC PANEL: CPT | Performed by: INTERNAL MEDICINE

## 2020-10-23 PROCEDURE — 85379 FIBRIN DEGRADATION QUANT: CPT | Performed by: INTERNAL MEDICINE

## 2020-10-23 PROCEDURE — 99231 SBSQ HOSP IP/OBS SF/LOW 25: CPT | Performed by: INTERNAL MEDICINE

## 2020-10-23 PROCEDURE — 82728 ASSAY OF FERRITIN: CPT | Performed by: INTERNAL MEDICINE

## 2020-10-23 PROCEDURE — 63710000001 INSULIN GLARGINE PER 5 UNITS: Performed by: INTERNAL MEDICINE

## 2020-10-23 PROCEDURE — 85025 COMPLETE CBC W/AUTO DIFF WBC: CPT | Performed by: INTERNAL MEDICINE

## 2020-10-23 PROCEDURE — 25010000002 ENOXAPARIN PER 10 MG: Performed by: HOSPITALIST

## 2020-10-23 PROCEDURE — 94762 N-INVAS EAR/PLS OXIMTRY CONT: CPT

## 2020-10-23 RX ADMIN — TAMSULOSIN HYDROCHLORIDE 0.4 MG: 0.4 CAPSULE ORAL at 20:42

## 2020-10-23 RX ADMIN — SODIUM CHLORIDE, PRESERVATIVE FREE 10 ML: 5 INJECTION INTRAVENOUS at 20:41

## 2020-10-23 RX ADMIN — INSULIN GLARGINE 30 UNITS: 100 INJECTION, SOLUTION SUBCUTANEOUS at 20:42

## 2020-10-23 RX ADMIN — INSULIN LISPRO 16 UNITS: 100 INJECTION, SOLUTION INTRAVENOUS; SUBCUTANEOUS at 08:56

## 2020-10-23 RX ADMIN — SODIUM CHLORIDE, PRESERVATIVE FREE 10 ML: 5 INJECTION INTRAVENOUS at 10:00

## 2020-10-23 RX ADMIN — FAMOTIDINE 20 MG: 20 TABLET, FILM COATED ORAL at 06:20

## 2020-10-23 RX ADMIN — DEXAMETHASONE 6 MG: 4 TABLET ORAL at 15:30

## 2020-10-23 RX ADMIN — INSULIN LISPRO 5 UNITS: 100 INJECTION, SOLUTION INTRAVENOUS; SUBCUTANEOUS at 17:18

## 2020-10-23 RX ADMIN — ENOXAPARIN SODIUM 40 MG: 40 INJECTION SUBCUTANEOUS at 08:56

## 2020-10-23 RX ADMIN — INSULIN LISPRO 16 UNITS: 100 INJECTION, SOLUTION INTRAVENOUS; SUBCUTANEOUS at 17:16

## 2020-10-23 RX ADMIN — INSULIN LISPRO 3 UNITS: 100 INJECTION, SOLUTION INTRAVENOUS; SUBCUTANEOUS at 13:08

## 2020-10-23 RX ADMIN — INSULIN LISPRO 3 UNITS: 100 INJECTION, SOLUTION INTRAVENOUS; SUBCUTANEOUS at 08:56

## 2020-10-23 RX ADMIN — FAMOTIDINE 20 MG: 20 TABLET, FILM COATED ORAL at 17:18

## 2020-10-23 RX ADMIN — LOSARTAN POTASSIUM 100 MG: 100 TABLET, FILM COATED ORAL at 20:42

## 2020-10-23 RX ADMIN — INSULIN LISPRO 16 UNITS: 100 INJECTION, SOLUTION INTRAVENOUS; SUBCUTANEOUS at 13:07

## 2020-10-23 NOTE — PROGRESS NOTES
Continued Stay Note  [facility]     Patient Name: Donta Maria  MRN: [id number]  Today's Date: 10/23/2020    Admit Date: 10/15/2020    Discharge Plan     Row Name 10/23/20 1700       Plan    Plan  Home    Plan Comments  Pt now on room air. Plan remains home w/ [facility] and [place] pending HH/home O2 needs.        Discharge Codes    No documentation.             Lola Holbrook RN

## 2020-10-23 NOTE — PROGRESS NOTES
Name: Donta Maria ADMIT: 10/15/2020   : 1955  PCP: Nena Maxwell MD    MRN: [id number] LOS: 7 days   AGE/SEX: 65 y.o. male  ROOM: [facility]   Subjective   Chief Complaint   Patient presents with   • COVID +   • Shortness of Breath     Now off oxygen  Dyspnea better  On steroids  BG very high- upper 300s still  On insulin only with partial improvement  Low HR at times- no symptoms    ROS  No f/c  No n/v  No cp/palp  +soa/cough    Objective   Vital Signs  Temp:  [96.4 °F (35.8 °C)-97.9 °F (36.6 °C)] 97.2 °F (36.2 °C)  Heart Rate:  [40-50] 40  Resp:  [16] 16  BP: (105-145)/(51-90) 127/56  SpO2:  [92 %-98 %] 92 %  on  Flow (L/min):  [6] 6;   Device (Oxygen Therapy): room air  Body mass index is 28.87 kg/m².    Physical Exam  Constitutional:       Appearance: He is well-developed. He is not ill-appearing.   HENT:      Head: Normocephalic and atraumatic.   Eyes:      General: No scleral icterus.  Neck:      Musculoskeletal: Neck supple.   Cardiovascular:      Rate and Rhythm: Regular rhythm. Bradycardia present.   Pulmonary:      Effort: No respiratory distress.      Breath sounds: No wheezing.   Abdominal:      General: There is no distension.      Palpations: Abdomen is soft.      Tenderness: There is no abdominal tenderness.   Neurological:      Mental Status: He is alert.   Psychiatric:         Behavior: Behavior normal.         Results Review:       I reviewed the patient's new clinical results.  Results from last 7 days   Lab Units 10/23/20  0745 10/19/20  0834 10/18/20  0945   WBC 10*3/mm3 6.98 5.38 5.64   HEMOGLOBIN g/dL 14.4 14.6 14.2   PLATELETS 10*3/mm3 173 198 177     Results from last 7 days   Lab Units 10/23/20  0745 10/20/20  0742 10/19/20  0834 10/18/20  0945 10/17/20  0640   SODIUM mmol/L 135* 139 140  --  137   POTASSIUM mmol/L 4.1 4.2 4.4  --  3.8   CHLORIDE mmol/L 99 101 100  --  102   CO2 mmol/L 28.8 27.6 29.5*  --  26.6   BUN mg/dL 14 18 18  --  17   CREATININE mg/dL 0.56* 0.84 0.72*  0.86 0.84   GLUCOSE mg/dL 295* 292* 308*  --  300*   Estimated Creatinine Clearance: 106.2 mL/min (A) (by C-G formula based on SCr of 0.56 mg/dL (L)).  Results from last 7 days   Lab Units 10/23/20  0745 10/20/20  0742 10/19/20  0834 10/18/20  0945   ALBUMIN g/dL 3.40* 3.30* 3.70 3.40*   BILIRUBIN mg/dL 0.3 0.3 0.4 0.4   ALK PHOS U/L 63 69 76 78   AST (SGOT) U/L 25 24 24 26   ALT (SGPT) U/L 31 23 26 28     Results from last 7 days   Lab Units 10/23/20  0745 10/20/20  0742 10/19/20  0834 10/18/20  0945 10/17/20  0640   CALCIUM mg/dL 8.7 8.7 9.2  --  8.7   ALBUMIN g/dL 3.40* 3.30* 3.70 3.40* 3.30*   MAGNESIUM mg/dL  --   --   --   --  2.1     Results from last 7 days   Lab Units 10/18/20  0945   PROCALCITONIN ng/mL <0.02       Coag     HbA1C   Lab Results   Component Value Date    HGBA1C 11.20 (H) 10/19/2020    HGBA1C 8.3 (H) 01/28/2020     Infection   Results from last 7 days   Lab Units 10/18/20  0945   PROCALCITONIN ng/mL <0.02     Radiology(recent) No radiology results for the last day  No results found for: TROPONINT, TROPONINI, BNP  No components found for: TSH;2    dexamethasone, 6 mg, Oral, Daily With Breakfast  enoxaparin, 40 mg, Subcutaneous, Q24H  famotidine, 20 mg, Oral, BID AC  insulin glargine, 30 Units, Subcutaneous, Nightly  insulin lispro, 0-24 Units, Subcutaneous, TID AC  insulin lispro, 5 Units, Subcutaneous, TID With Meals  losartan, 100 mg, Oral, Nightly  sodium chloride, 10 mL, Intravenous, Q12H  tamsulosin, 0.4 mg, Oral, Nightly       Diet Regular; Consistent Carbohydrate      Assessment/Plan      Active Hospital Problems    Diagnosis  POA   • **Pneumonia due to COVID-19 virus [U07.1, J12.89]  Yes   • Type 2 diabetes mellitus with hyperglycemia (CMS/HCC) [E11.65]  Yes   • Sepsis due to pneumonia (CMS/HCC) [J18.9, A41.9]  Yes   • Viral pneumonia [J12.9]  Yes   • Hypokalemia [E87.6]  Yes   • Hyponatremia [E87.1]  Yes   • LFTs abnormal [R94.5]  Yes   • Acute respiratory failure with hypoxia  (CMS/[facility]) [J96.01]  Yes      Resolved Hospital Problems   No resolved problems to display.       · Supportive care with dexamethasone, remdesivir  · O2 requirements improved. Will check overnight oximetry.  · Increase insulin again on 10/22, increased SSI, added scheduled insulin with meals as well, monitor BG closely  · Monitor labs  · lovenox for dvt proph  · Asymptomatic bradycardia- but significant at times- Cardiology following closely    Thanks to specialists who are following    ROBINSON RN  [facility]  Reviewed records    Greater than 36 minutes spent with greater than 50% counseling and coordinating care      Riley Chang MD  [facility]  10/23/20  10:43 EDT

## 2020-10-23 NOTE — PLAN OF CARE
Goal Outcome Evaluation:  Plan of Care Reviewed With: patient  Progress: improving  Outcome Summary: pt was taken off of O2 at begining of shift and has remained stable on RA today. While sleeping o2 will drop to 88-89%, sleep apnea test has been ordered. Pt denies pain, VSS, up adlib. Anticipating d/c tomorrow. WCTM

## 2020-10-23 NOTE — PLAN OF CARE
Goal Outcome Evaluation:  Plan of Care Reviewed With: patient  Progress: improving   Pt remained asymptomatic aurelio through night, 6L nasal cannula through night with O2 sats in the 90's,

## 2020-10-23 NOTE — PROGRESS NOTES
[facility]  Cardiology Progress Note    Patient Identification:  Name: Donta Maria  Age: 65 y.o.  Sex: male  :  1955  MRN: [id number]                 Follow Up / Chief Complaint: Bradycardia    Interval History: Sinus bradycardia on the monitor. No more pauses noted.     Subjective:    CDC recommendation for Covid    Objective:    Past Medical History:  Past Medical History:   Diagnosis Date   • Diabetes mellitus (CMS/HCC)    • Hypertension    • Rheumatoid arthritis (CMS/HCC)    • Rheumatoid arthritis (CMS/HCC)      Past Surgical History:  History reviewed. No pertinent surgical history.     Social History:   Social History     Tobacco Use   • Smoking status: Former Smoker     Types: Cigarettes     Quit date: 11/15/2008     Years since quittin.9   • Smokeless tobacco: Former User   Substance Use Topics   • Alcohol use: Yes     Comment: rarely      Family History:  History reviewed. No pertinent family history.       Allergies:  Allergies   Allergen Reactions   • Lisinopril Other (See Comments)     Dry cough     Scheduled Meds:  dexamethasone, 6 mg, Daily With Breakfast  enoxaparin, 40 mg, Q24H  famotidine, 20 mg, BID AC  insulin glargine, 30 Units, Nightly  insulin lispro, 0-24 Units, TID AC  insulin lispro, 5 Units, TID With Meals  losartan, 100 mg, Nightly  sodium chloride, 10 mL, Q12H  tamsulosin, 0.4 mg, Nightly            INTAKE AND OUTPUT:  No intake or output data in the 24 hours ending 10/23/20 1722    Review of Systems: CDC recommendation for Covid   GI:  Cardiac:  Pulmonary:    Constitutional:  Temp:  [96.4 °F (35.8 °C)-97.5 °F (36.4 °C)] 97.2 °F (36.2 °C)  Heart Rate:  [40-48] 40  Resp:  [16] 16  BP: (105-142)/(51-90) 127/56    Physical Exam:     CBC recommendation for Covid        Cardiographics  Telemetry:                           ECG:      Interpretation Summary    · Left ventricular ejection fraction appears to be 51 - 55%.  · Left ventricular ejection fraction appears  "to be 51 - 55%  · There is no evidence of pericardial effusion.         Lab Review   Results from last 7 days   Lab Units 10/23/20  0745 10/19/20  0834 10/17/20  0640   CK TOTAL U/L 42  --  68   TROPONIN T ng/mL  --  <0.010  --      Results from last 7 days   Lab Units 10/17/20  0640   MAGNESIUM mg/dL 2.1     Results from last 7 days   Lab Units 10/23/20  0745   SODIUM mmol/L 135*   POTASSIUM mmol/L 4.1   BUN mg/dL 14   CREATININE mg/dL 0.56*   CALCIUM mg/dL 8.7        Results from last 7 days   Lab Units 10/23/20  0745 10/19/20  0834 10/18/20  0945   WBC 10*3/mm3 6.98 5.38 5.64   HEMOGLOBIN g/dL 14.4 14.6 14.2   HEMATOCRIT % 41.8 43.9 42.2   PLATELETS 10*3/mm3 173 198 177         The following medical decision was discussed in detail with Dr. Jay    Assessment:  Bradycardia asymptomatic  Pause noted 4.0, as above  Acute respiratory failure with hypoxia viral pneumonia pneumonia due to COVID-19 virus  Hypokalemia   hyponatremia  Elevated LFTs  diabetes mellitus   sepsis due to pneumonia        Plan:  Blood pressure stable.  He remains sinus bradycardia on the monitor.  Is asymptomatic.  No pauses noted overnight.  Conservative management.     Patient with a Covid pneumonia bradycardia asymptomatic continue to treat conservatively    Nena Jay MD            )10/23/2020       EMR Dragon/Transcription:   \"Dictated utilizing Dragon dictation\".     "

## 2020-10-23 NOTE — PROGRESS NOTES
[facility]  [phone]  Jayson Damon MD    Subjective:  LOS: 7    Remains fatigued. Now tolerating RA.    Objective   Vital Signs past 24hrs    Temp range: Temp (24hrs), Av.2 °F (36.2 °C), Min:96.4 °F (35.8 °C), Max:97.9 °F (36.6 °C)    BP range: BP: (105-145)/(51-90) 105/51  Pulse range: Heart Rate:  [40-50] 40  Resp rate range: Resp:  [16] 16    Device (Oxygen Therapy): room airFlow (L/min):  [6] 6  Oxygen range:SpO2:  [97 %-98 %] 97 %      93.9 kg (207 lb); Body mass index is 28.87 kg/m².  No intake or output data in the 24 hours ending 10/23/20 1155    Physical Exam  Constitutional:       Appearance: Normal appearance.   HENT:      Head: Normocephalic.      Mouth/Throat:      Mouth: Mucous membranes are moist.   Eyes:      Conjunctiva/sclera: Conjunctivae normal.      Pupils: Pupils are equal, round, and reactive to light.   Cardiovascular:      Rate and Rhythm: Regular rhythm. Bradycardia present.      Heart sounds: Normal heart sounds. No murmur.   Pulmonary:      Breath sounds: No rales.   Abdominal:      General: Bowel sounds are normal. There is no distension.      Palpations: Abdomen is soft. There is no mass.      Tenderness: There is no abdominal tenderness.   Musculoskeletal:         General: No swelling.   Neurological:      Mental Status: He is alert.       Results Review:    I have reviewed the laboratory and imaging data since the last note by LPC physician.  My annotations are noted in assessment and plan.    COVID LABS:  Results From Last 14 Days   Lab Units 10/23/20  0745 10/19/20  0834 10/18/20  0945 10/17/20  0640  10/15/20  1808   CK TOTAL U/L 42  --   --  68  --   --    CRP mg/dL 0.27  --  3.96* 3.22*   < >  --    D DIMER QUANT MCGFEU/mL 0.70*  --  0.66* 0.90*   < >  --    FERRITIN ng/mL 626.00*  --   --   --   --   --    LACTATE mmol/L  --   --   --   --   --  1.2   LDH U/L 293*  --   --  423*  --   --    PROCALCITONIN ng/mL  --   --  <0.02  --   --  0.06   TROPONIN T  ng/mL  --  <0.010  --   --   --   --     < > = values in this interval not displayed.         Medication Review:  I have reviewed the current MAR.  My annotations are noted in assessment and plan.       Plan   PCCM Problems  AHRF  Covid-19 infection  Bilateral infiltrates, R>L  Bradycardia  DM2      Plan of Treatment  Ongoing supportive treatment for COVID-19 infection.  Now on dexa 6 mg iv bid and finished Remdesivir. Tolerating RA. Switch to po dexamethasone 6 mg daily x 2 days more then sto.    Note cardiology following for bradycardia.  Continues to have a heart rate in the 40s.    Fatigued but overall improved.    Requires fu chest imaging with PCP in 6-8 weeks.    Pulmonary will sign off. Please call as needed.    Jayson Damon MD  10/23/20  11:55 EDT    While in the room and during my examination of the patient I wore gloves, gown, mask, eye protection and followed enhanced droplet/contact isolation protocol and precautions.  I washed my hands before and after this patient encounter.    Communication:  Please USE SECURE CHAT TO MESSAGE ME in EPIC on In-Patient Care of this patient.     Part of this note may be an electronic transcription/translation of spoken language to printed text using the Dragon Dictation System.

## 2020-10-24 ENCOUNTER — READMISSION MANAGEMENT (OUTPATIENT)
Dept: CALL CENTER | Facility: HOSPITAL | Age: 65
End: 2020-10-24

## 2020-10-24 VITALS
DIASTOLIC BLOOD PRESSURE: 69 MMHG | TEMPERATURE: 98 F | RESPIRATION RATE: 18 BRPM | HEIGHT: 71 IN | BODY MASS INDEX: 28.98 KG/M2 | OXYGEN SATURATION: 90 % | HEART RATE: 58 BPM | WEIGHT: 207 LBS | SYSTOLIC BLOOD PRESSURE: 122 MMHG

## 2020-10-24 LAB
GLUCOSE BLDC GLUCOMTR-MCNC: 231 MG/DL (ref 70–130)
GLUCOSE BLDC GLUCOMTR-MCNC: 281 MG/DL (ref 70–130)

## 2020-10-24 PROCEDURE — 25010000002 ENOXAPARIN PER 10 MG: Performed by: HOSPITALIST

## 2020-10-24 PROCEDURE — 63710000001 DEXAMETHASONE PER 0.25 MG: Performed by: INTERNAL MEDICINE

## 2020-10-24 PROCEDURE — 63710000001 INSULIN LISPRO (HUMAN) PER 5 UNITS: Performed by: INTERNAL MEDICINE

## 2020-10-24 PROCEDURE — 99231 SBSQ HOSP IP/OBS SF/LOW 25: CPT | Performed by: INTERNAL MEDICINE

## 2020-10-24 PROCEDURE — 82962 GLUCOSE BLOOD TEST: CPT

## 2020-10-24 RX ADMIN — INSULIN LISPRO 5 UNITS: 100 INJECTION, SOLUTION INTRAVENOUS; SUBCUTANEOUS at 12:07

## 2020-10-24 RX ADMIN — INSULIN LISPRO 5 UNITS: 100 INJECTION, SOLUTION INTRAVENOUS; SUBCUTANEOUS at 08:29

## 2020-10-24 RX ADMIN — DEXAMETHASONE 6 MG: 4 TABLET ORAL at 08:28

## 2020-10-24 RX ADMIN — INSULIN LISPRO 12 UNITS: 100 INJECTION, SOLUTION INTRAVENOUS; SUBCUTANEOUS at 12:06

## 2020-10-24 RX ADMIN — SODIUM CHLORIDE, PRESERVATIVE FREE 10 ML: 5 INJECTION INTRAVENOUS at 08:29

## 2020-10-24 RX ADMIN — ENOXAPARIN SODIUM 40 MG: 40 INJECTION SUBCUTANEOUS at 08:29

## 2020-10-24 RX ADMIN — INSULIN LISPRO 8 UNITS: 100 INJECTION, SOLUTION INTRAVENOUS; SUBCUTANEOUS at 08:29

## 2020-10-24 RX ADMIN — FAMOTIDINE 20 MG: 20 TABLET, FILM COATED ORAL at 06:32

## 2020-10-24 NOTE — PROGRESS NOTES
[facility] Progress Note       Encounter Date:10/24/20  Patient:Donta Maria  :1955  MRN:[id number]      Chief Complaint: Follow-up bradycardia      Subjective:        Chart reviewed.  Patient not examined in order to minimize PPE use and avoid unnecessary Covid exposure.  Telemetry strips reviewed remains bradycardic but no significant arrhythmias which would require pacemaker.    Review of Systems:  Review of Systems   Reason unable to perform ROS: Deferred.       Medications:    Current Facility-Administered Medications:   •  acetaminophen (TYLENOL) tablet 650 mg, 650 mg, Oral, Q4H PRN, 650 mg at 10/18/20 0846 **OR** acetaminophen (TYLENOL) 160 MG/5ML solution 650 mg, 650 mg, Oral, Q4H PRN **OR** acetaminophen (TYLENOL) suppository 650 mg, 650 mg, Rectal, Q4H PRN, Em Bean APRN  •  dextrose (D50W) 25 g/ 50mL Intravenous Solution 25 g, 25 g, Intravenous, Q15 Min PRN, Alonso Duarte APRN  •  dextrose (GLUTOSE) oral gel 15 g, 15 g, Oral, Q15 Min PRN, Alonso Duarte APRN  •  enoxaparin (LOVENOX) syringe 40 mg, 40 mg, Subcutaneous, Q24H, Alvaro Schroeder MD, 40 mg at 10/24/20 0829  •  famotidine (PEPCID) tablet 20 mg, 20 mg, Oral, BID Alexandre CHAN Troy Andrew, MD, 20 mg at 10/24/20 0632  •  glucagon (human recombinant) (GLUCAGEN DIAGNOSTIC) injection 1 mg, 1 mg, Subcutaneous, Q15 Min PRN, Alonso Duarte APRN  •  insulin glargine (LANTUS) injection 30 Units, 30 Units, Subcutaneous, Nightly, Riley Chang MD, 30 Units at 10/23/20 2042  •  insulin lispro (humaLOG) injection 0-24 Units, 0-24 Units, Subcutaneous, TID ACBernardo Alan David, MD, 12 Units at 10/24/20 1206  •  insulin lispro (humaLOG) injection 5 Units, 5 Units, Subcutaneous, TID With Meals, Riley Chang MD, 5 Units at 10/24/20 1207  •  losartan (COZAAR) tablet 100 mg, 100 mg, Oral, Nightly, Alvaro Schroeder MD, 100 mg at 10/23/20 2042  •  melatonin tablet 3 mg, 3 mg, Oral, Nightly  PRN, Alvaro Schroeder MD, 3 mg at 10/22/20 2240  •  nitroglycerin (NITROSTAT) SL tablet 0.4 mg, 0.4 mg, Sublingual, Q5 Min PRN, Em Bean APRN  •  ondansetron (ZOFRAN) injection 4 mg, 4 mg, Intravenous, Q6H PRN, Em Bean APRN  •  [COMPLETED] Insert peripheral IV, , , Once **AND** sodium chloride 0.9 % flush 10 mL, 10 mL, Intravenous, PRN, Lanie Manuel APRN  •  sodium chloride 0.9 % flush 10 mL, 10 mL, Intravenous, Q12H, Em Bean APRN, 10 mL at 10/24/20 0829  •  sodium chloride 0.9 % flush 10 mL, 10 mL, Intravenous, PRN, Em Bean APRN  •  tamsulosin (FLOMAX) 24 hr capsule 0.4 mg, 0.4 mg, Oral, Nightly, Alvaro Schroeder MD, 0.4 mg at 10/23/20 2042       Objective:       Vitals:    10/23/20 2232 10/23/20 2303 10/24/20 0500 10/24/20 0759   BP:  143/72 159/90 144/77   BP Location:  Right arm Right arm Left arm   Patient Position:  Lying Lying Lying   Pulse: 53 (!) 46 (!) 46 (!) 46   Resp:  18 18 18   Temp:  98 °F (36.7 °C) 96.7 °F (35.9 °C) 97 °F (36.1 °C)   TempSrc:  Oral Oral Oral   SpO2: 90% 90% 92% 91%   Weight:       Height:               Physical Exam:  Deferred         Lab Review:   Lab Results   Component Value Date    GLUCOSE 295 (H) 10/23/2020    BUN 14 10/23/2020    CREATININE 0.56 (L) 10/23/2020    EGFRIFNONA 146 10/23/2020    BCR 25.0 10/23/2020    K 4.1 10/23/2020    CO2 28.8 10/23/2020    CALCIUM 8.7 10/23/2020    ALBUMIN 3.40 (L) 10/23/2020    LABIL2 2.0 01/28/2020    AST 25 10/23/2020    ALT 31 10/23/2020       Lab Results   Component Value Date    WBC 6.98 10/23/2020    HGB 14.4 10/23/2020    HCT 41.8 10/23/2020    MCV 86.4 10/23/2020     10/23/2020       Lab Results   Component Value Date    CKTOTAL 42 10/23/2020    TROPONINT <0.010 10/19/2020       Lab Results   Component Value Date    CHLPL 147 01/28/2020    CHLPL 178 06/21/2019     Lab Results   Component Value Date    TRIG 173 (H) 01/28/2020    TRIG 218 (H) 06/21/2019     Lab Results    Component Value Date    HDL 37 (L) 01/28/2020    HDL 35 (L) 06/21/2019     Lab Results   Component Value Date    LDL 75 01/28/2020    LDL 99 06/21/2019       No results found for: TSH    Telemetry strips reviewed and heart rate remains mainly in the 50s       Assessment:          Diagnosis Plan   1. Acute respiratory failure with hypoxia (CMS/HCC)     2. Pneumonia due to COVID-19 virus            Plan:       Mr. Blanc is a 65-year-old gentleman with past medical history notable for diabetes who presented the hospital with COVID-19 infection was found to be bradycardic but fortunately is asymptomatic.  His heart rate remains low but stable.  We will continue to follow peripherally but no changes needed at this time.    Bradycardia:  · Continues to have sinus bradycardia but is clinically insignificant and will continue to monitor.  · If any changes in his clinical status due to bradycardia can reconsider need for pacemaker or medical therapy.               Gary Beach MD  [facility]  10/24/20  12:19 EDT

## 2020-10-24 NOTE — PLAN OF CARE
Goal Outcome Evaluation:  Plan of Care Reviewed With: patient  Progress: improving  Resting abed at this. Overnight sleep oximetry in progress. Remains on room air. Declined sleep agent tonight. Up ad pedro. Voiding spontaneously. Verbalizes all meals. No complaints verbalized. Call light in reach. Will continue to monitor

## 2020-10-24 NOTE — PROGRESS NOTES
Continued Stay Note  [facility]     Patient Name: Donta Maria  MRN: [id number]  Today's Date: 10/24/2020    Admit Date: 10/15/2020    Discharge Plan     Row Name 10/24/20 1339       Plan    Plan  10/24- Referral faxed to [place] for nocturnal oxygen;  Pt to be followed by [facility] at d/c.    Final Note  Referral called and faxed to [facility] [phone] at 1:28 PM for nocturnal oxygen.  Pt will d/c home with [facility].   left for Dianne/[facility] to advise Pt has d/c orders home.  CAITLYN WISE/CP        Discharge Codes    No documentation.       Expected Discharge Date and Time     Expected Discharge Date Expected Discharge Time    Oct 24, 2020             Kayla Kamara RN

## 2020-10-24 NOTE — OUTREACH NOTE
Prep Survey      Responses   [facility] facility patient discharged from?  [place]   Is LACE score < 7 ?  No   Eligibility  Readm Mgmt   Discharge diagnosis  PNA due to Covid 19   Does the patient have one of the following disease processes/diagnoses(primary or secondary)?  COVID-19   Does the patient have Home health ordered?  Yes   What is the Home health agency?   [facility] [place]   Is there a DME ordered?  Yes   What DME was ordered?  [place] for oxgyjac   Prep survey completed?  Yes          Donna Shahid RN

## 2020-10-24 NOTE — DISCHARGE SUMMARY
NAME: Donta Maria ADMIT: 10/15/2020   : 1955  PCP: Nena Maxwell MD    MRN: [id number] LOS: 8 days   AGE/SEX: 65 y.o. male  [facility]OM: N[facility]     Date of Admission:  10/15/2020  Date of Discharge:  10/24/2020    PCP: Nena Maxwell MD    CHIEF COMPLAINT  COVID + and Shortness of Breath      DISCHARGE DIAGNOSIS  Active Hospital Problems    Diagnosis  POA   • **Pneumonia due to COVID-19 virus [U07.1, J12.89]  Yes   • Type 2 diabetes mellitus with hyperglycemia (CMS/[facility]) [E11.65]  Yes   • Sepsis due to pneumonia (CMS/[facility]) [J18.9, A41.9]  Yes   • Viral pneumonia [J12.9]  Yes   • Hypokalemia [E87.6]  Yes   • Hyponatremia [E87.1]  Yes   • LFTs abnormal [R94.5]  Yes   • Acute respiratory failure with hypoxia (CMS/[facility]) [J96.01]  Yes      Resolved Hospital Problems   No resolved problems to display.       SECONDARY DIAGNOSES  Past Medical History:   Diagnosis Date   • Diabetes mellitus (CMS/HCC)    • Hypertension    • Rheumatoid arthritis (CMS/HCC)    • Rheumatoid arthritis (CMS/[facility])        CONSULTS   Pulmonary  ID  Cardiology    HOSPITAL COURSE  Patient is a 65 y.o. male presented to [facility] complaining of with history of DM, HTN, RA who presented with dyspnea and found to have COVID-19.  Please see the admitting history and physical for further details.      Patient did have fairly severe disease requiring high flow oxygen at times.  He was given treatment including oxygen, dexamethasone, remdesivir.  He gradually improved over the course of about a week and a half and yesterday was able to wean off oxygen.  He continues to feel better and wishes for discharge.  He did qualify for nighttime oxygen with a nocturnal oximetry showing less than 89% for greater than 7 minutes.  I suspect this will continue to improve but I am prescribing him oxygen at night at discharge.  This can be monitored and reassessed by his outpatient physicians in the coming weeks.    The patient did have bradycardia  as well as some pauses.  He was asymptomatic.  Cardiology saw the patient and do not currently believe it is indicated for any pacemaker.  He should follow up with them as an outpatient and avoid rate lowering medications.    He did have uncontrolled diabetes with significantly elevated A1c which was over 11.  He was on Metformin as an outpatient but not on any insulin.  He did require fairly large amount of insulin here while hospitalized.  Partly also had hyperglycemia related to the dexamethasone that he was on while in the hospital.  Still he does need to be on insulin at discharge and discussion with him he would prefer just long-acting insulin for now so prescribing 40 units nightly of Lantus and he is going to call his endocrinologist and keep track of his blood glucose for further titration as an outpatient.         DIAGNOSTICS    XR Chest 1 View [[id number]] Andrea as Reviewed   Order Status: Completed Collected: 10/17/20 2232    Updated: 10/17/20 2239   Narrative:     PORTABLE CHEST RADIOGRAPH       HISTORY: Desaturations. Patient is having positive.       COMPARISON: 10/15/2020       FINDINGS:   Cardiomegaly is present. There is no definite vascular congestion. There   is a suggestion of some hazy opacity within the periphery of the right   lung, which could reflect some worsening infiltrate, and this patient   with a history of COVID. Left lung appears clear. No pneumothorax or   definite pleural effusion is seen.       Impression:     Suggestion of some hazy opacities within the periphery of the right   lung. These may reflect progression of this patient's known COVID.       This report was finalized on 10/17/2020 10:36 PM by Dr. Laura Mg M.D.       XR Chest AP [[id number]] Andrea as Reviewed   Order Status: Completed Collected: 10/15/20 1811    Updated: 10/15/20 1818   Narrative:     STAT PORTABLE RADIOGRAPHIC VIEW OF THE CHEST       CLINICAL HISTORY: Covid evaluation.       FINDINGS: Stat  portable radiographic view of the chest demonstrates   vague areas of increased density within the right lung worrisome for   ground-glass infiltrates. These findings could be confirmed with a CT   scan of the chest, if clinically indicated. The left lung is clear. The   cardiomediastinal silhouette is within normal limits. The osseous   structures are unremarkable.       These findings and recommendations were discussed with Lanie Manuel on   10/15/2020 at approximately 5:49 PM.        10/23/2020 0745 10/23/2020 0924 Ferritin [[id number]]    (Abnormal)   Blood    Final result Component Value Units   Ferritin 626.00High  ng/mL           10/23/2020 0745 10/23/2020 0920 Lactate Dehydrogenase [[id number]]   (Abnormal)   Blood    Final result Component Value Units   LDH 293High   U/L           10/23/2020 0745 10/23/2020 0913 D-dimer, Quantitative [[id number]]    (Abnormal)   Blood    Final result Component Value Units   D-Dimer, Quant 0.70High  MCGFEU/mL           10/23/2020 0745 10/23/2020 0918 Comprehensive Metabolic Panel [[id number]]    (Abnormal)   Blood    Final result Component Value Units   Glucose 295High  mg/dL   BUN 14 mg/dL   Creatinine 0.56Low  mg/dL   Sodium 135Low  mmol/L   Potassium 4.1 mmol/L   Chloride 99 mmol/L   CO2 28.8 mmol/L   Calcium 8.7 mg/dL   Total Protein 5.4Low  g/dL   Albumin 3.40Low  g/dL   ALT (SGPT) 31 U/L   AST (SGOT) 25 U/L   Alkaline Phosphatase 63 U/L   Total Bilirubin 0.3 mg/dL   eGFR Non African Am 146 mL/min/1.73   Globulin 2.0 gm/dL   A/G Ratio 1.7 g/dL   BUN/Creatinine Ratio 25.0    Anion Gap 7.2 mmol/L           10/23/2020 0745 10/23/2020 0918 CK [[id number]]   Blood    Final result Component Value Units   Creatine Kinase 42 U/L           10/23/2020 0745 10/23/2020 0918 C-reactive Protein [[id number]]   Blood    Final result Component Value Units   C-Reactive Protein 0.27 mg/dL           10/23/2020 0745 10/23/2020 0854 CBC Auto Differential [[id number]]   (Abnormal)   Blood    Final  result Component Value Units   WBC 6.98 10*3/mm3   RBC 4.84 10*6/mm3   Hemoglobin 14.4 g/dL   Hematocrit 41.8 %   MCV 86.4 fL   MCH 29.8 pg   MCHC 34.4 g/dL   RDW 13.0 %   RDW-SD 40.2 fl   MPV 11.7 fL   Platelets 173 10*3/mm3   Neutrophil Rel % 75.5 %   Lymphocyte Rel % 13.3Low  %   Monocyte Rel % 7.3 %   Eosinophil Rel % 0.0Low  %   Basophil Rel % 0.3 %        10/19/2020 0834 10/19/2020 0949 Hemoglobin A1c [[id number]]    (Abnormal)   Blood    Final result Component Value Units   Hemoglobin A1C 11.20High            PHYSICAL EXAM  Objective    Alert  nad  No resp distress  Asking to be discharged    CONDITION ON DISCHARGE  Stable.      DISCHARGE DISPOSITION   Home or Self Care      DISCHARGE MEDICATIONS       Your medication list      START taking these medications      Instructions Last Dose Given Next Dose Due   Insulin Glargine 100 UNIT/ML injection pen  Commonly known as: LANTUS SOLOSTAR      Inject 40 Units under the skin into the appropriate area as directed Every Night.          CONTINUE taking these medications      Instructions Last Dose Given Next Dose Due   losartan 50 MG tablet  Commonly known as: COZAAR      Take 100 mg by mouth Every Night.       metFORMIN 1000 MG tablet  Commonly known as: GLUCOPHAGE      Take 2,000 mg by mouth Every Night.       tamsulosin 0.4 MG capsule 24 hr capsule  Commonly known as: FLOMAX      Take 1 capsule by mouth Every Night.       Vascepa 1 g capsule capsule  Generic drug: icosapent ethyl      Take 2 g by mouth Every Night.          STOP taking these medications    amLODIPine 10 MG tablet  Commonly known as: NORVASC              Where to Get Your Medications      These medications were sent to [id number] - [place], [place] - [address] [facility] AT [facility] [address] - [phone]  - [phone] FX  [address] [facility] [address]    Phone: [phone]   · Insulin Glargine 100 UNIT/ML injection pen        No future  appointments.  Additional Instructions for the Follow-ups that You Need to Schedule     Discharge Follow-up with Specialty: PCP in 2 weeks. Cardiology in 2 weeks, Call endocriniologist in 2 days and let them know what your sugars have been running for further adjustements   As directed      Specialty: PCP in 2 weeks. Cardiology in 2 weeks, Call endocriniologist in 2 days and let them know what your sugars have been running for further adjustements            Contact information for follow-up providers     Nena Maxwell MD .    Specialty: Internal Medicine  Contact information:  [address]  [facility] [address]  [facility] [id number] [phone]                   Contact information for after-discharge care     Home Medical Care     [facility] .    Service: Home Health Services  Contact information:  [address] [facility] [address]  [facility] [address] [phone]                             TEST  RESULTS PENDING AT DISCHARGE         Riley Chang MD  [facility]  10/24/20  13:05 EDT      Time: greater than 32 minutes on discharge  It was a pleasure taking care of this patient while in the hospital.

## 2020-10-25 ENCOUNTER — READMISSION MANAGEMENT (OUTPATIENT)
Dept: CALL CENTER | Facility: HOSPITAL | Age: 65
End: 2020-10-25

## 2020-10-25 NOTE — OUTREACH NOTE
COVID-19 Week 1 Survey      Responses   [facility] [place] patient discharged from?  [place]   Does the patient have one of the following disease processes/diagnoses(primary or secondary)?  COVID-19   COVID-19 underlying condition?  None   Call Number  Call 1   Week 1 Call successful?  No   Discharge diagnosis  PNA due to Covid 19          Emilie Samuels RN

## 2020-10-26 ENCOUNTER — READMISSION MANAGEMENT (OUTPATIENT)
Dept: CALL CENTER | Facility: HOSPITAL | Age: 65
End: 2020-10-26

## 2020-10-26 NOTE — OUTREACH NOTE
COVID-19 Week 1 Survey      Responses   [facility] patient discharged from?  [place]   Does the patient have one of the following disease processes/diagnoses(primary or secondary)?  COVID-19   COVID-19 underlying condition?  None   Call Number  Call 2   Week 1 Call successful?  No   Discharge diagnosis  PNA due to Covid 19          Karina Hummel RN

## 2020-10-27 ENCOUNTER — READMISSION MANAGEMENT (OUTPATIENT)
Dept: CALL CENTER | Facility: HOSPITAL | Age: 65
End: 2020-10-27

## 2020-10-27 NOTE — OUTREACH NOTE
COVID-19 Week 1 Survey      Responses   [facility] patient discharged from?  [place]   Does the patient have one of the following disease processes/diagnoses(primary or secondary)?  COVID-19   COVID-19 underlying condition?  None   Call Number  Call 3   Week 1 Call successful?  No   Discharge diagnosis  PNA due to Covid 19          Donna Shahid RN

## 2020-10-28 NOTE — PAYOR COMM NOTE
"Donta Butt (65 y.o. Male)     PLEASE SEE ATTACHED DC SUMMARY    REF#[id number]     PLEASE CALL   OR  [phone] WITH APPROVAL FOR 10/22/20 THROUGH DC 10/24/20    THANK YOU    KAYCE PEDRO LPN CCP    Date of Birth Social Security Number Address Home Phone MRN    1955  [address] [id number] [phone] [id number]    Jain Marital Status          None Single       Admission Date Admission Type Admitting Provider Attending Provider Department, Room/Bed    10/15/20 Emergency Tereso Nash MD  [address], [address]/1    Discharge Date Discharge Disposition Discharge Destination        10/24/2020 Home or Self Care              Attending Provider: (none)   Allergies: Lisinopril    Isolation: Enh Drop/Con   Infection: COVID (confirmed) (10/15/20)   Code Status: Prior    Ht: 180.3 cm (71\")   Wt: 93.9 kg (207 lb)    Admission Cmt: None   Principal Problem: Pneumonia due to COVID-19 virus [U07.1,J12.89]                 Active Insurance as of 10/15/2020     Primary Coverage     Payor Plan Insurance Group Employer/Plan Group    ANTHEM BLUE CROSS ANTH BLUE CROSS BLUE SHIELD O [id number]     Payor Plan Address Payor Plan Phone Number Payor Plan Fax Number Effective Dates    [address] [phone]  2019 - None Entered    [place] [address]       Subscriber Name Subscriber Birth Date Member ID       DONTA BUTT 1955 [id number]                 Emergency Contacts      (Rel.) Home Phone Work Phone Mobile Phone    Shakira Saldana (Sister) -- -- [phone]               Discharge Summary      Riley Chang MD at 10/24/20 1302                 NAME: Donta Butt ADMIT: 10/15/2020   : 1955  PCP: Nena Maxwell MD    MRN: [id number] LOS: 8 days   AGE/SEX: 65 y.o. male  ROOM: [facility]     Date of Admission:  10/15/2020  Date of Discharge:  10/24/2020    PCP: Nena Maxwell MD    CHIEF " COMPLAINT  COVID + and Shortness of Breath      DISCHARGE DIAGNOSIS  Active Hospital Problems    Diagnosis  POA   • **Pneumonia due to COVID-19 virus [U07.1, J12.89]  Yes   • Type 2 diabetes mellitus with hyperglycemia (CMS/[facility]) [E11.65]  Yes   • Sepsis due to pneumonia (CMS/[facility]) [J18.9, A41.9]  Yes   • Viral pneumonia [J12.9]  Yes   • Hypokalemia [E87.6]  Yes   • Hyponatremia [E87.1]  Yes   • LFTs abnormal [R94.5]  Yes   • Acute respiratory failure with hypoxia (CMS/[facility]) [J96.01]  Yes      Resolved Hospital Problems   No resolved problems to display.       SECONDARY DIAGNOSES  Past Medical History:   Diagnosis Date   • Diabetes mellitus (CMS/[facility])    • Hypertension    • Rheumatoid arthritis (CMS/[facility])    • Rheumatoid arthritis (CMS/[facility])        CONSULTS   Pulmonary  ID  Cardiology    HOSPITAL COURSE  Patient is a 65 y.o. male presented to [facility] complaining of with history of DM, HTN, RA who presented with dyspnea and found to have COVID-19.  Please see the admitting history and physical for further details.      Patient did have fairly severe disease requiring high flow oxygen at times.  He was given treatment including oxygen, dexamethasone, remdesivir.  He gradually improved over the course of about a week and a half and yesterday was able to wean off oxygen.  He continues to feel better and wishes for discharge.  He did qualify for nighttime oxygen with a nocturnal oximetry showing less than 89% for greater than 7 minutes.  I suspect this will continue to improve but I am prescribing him oxygen at night at discharge.  This can be monitored and reassessed by his outpatient physicians in the coming weeks.    The patient did have bradycardia as well as some pauses.  He was asymptomatic.  Cardiology saw the patient and do not currently believe it is indicated for any pacemaker.  He should follow up with them as an outpatient and avoid rate lowering medications.    He did have uncontrolled diabetes  with significantly elevated A1c which was over 11.  He was on Metformin as an outpatient but not on any insulin.  He did require fairly large amount of insulin here while hospitalized.  Partly also had hyperglycemia related to the dexamethasone that he was on while in the hospital.  Still he does need to be on insulin at discharge and discussion with him he would prefer just long-acting insulin for now so prescribing 40 units nightly of Lantus and he is going to call his endocrinologist and keep track of his blood glucose for further titration as an outpatient.         DIAGNOSTICS    XR Chest 1 View [[id number]] Andrea as Reviewed   Order Status: Completed Collected: 10/17/20 2232    Updated: 10/17/20 2239   Narrative:     PORTABLE CHEST RADIOGRAPH       HISTORY: Desaturations. Patient is having positive.       COMPARISON: 10/15/2020       FINDINGS:   Cardiomegaly is present. There is no definite vascular congestion. There   is a suggestion of some hazy opacity within the periphery of the right   lung, which could reflect some worsening infiltrate, and this patient   with a history of COVID. Left lung appears clear. No pneumothorax or   definite pleural effusion is seen.       Impression:     Suggestion of some hazy opacities within the periphery of the right   lung. These may reflect progression of this patient's known COVID.       This report was finalized on 10/17/2020 10:36 PM by Dr. Laura Mg M.D.       XR Chest AP [[id number]] Andrea as Reviewed   Order Status: Completed Collected: 10/15/20 1811    Updated: 10/15/20 1818   Narrative:     STAT PORTABLE RADIOGRAPHIC VIEW OF THE CHEST       CLINICAL HISTORY: Covid evaluation.       FINDINGS: Stat portable radiographic view of the chest demonstrates   vague areas of increased density within the right lung worrisome for   ground-glass infiltrates. These findings could be confirmed with a CT   scan of the chest, if clinically indicated. The left lung is clear.  The   cardiomediastinal silhouette is within normal limits. The osseous   structures are unremarkable.       These findings and recommendations were discussed with Lanie Manuel on   10/15/2020 at approximately 5:49 PM.        10/23/2020 0745 10/23/2020 0924 Ferritin [[id number]]    (Abnormal)   Blood    Final result Component Value Units   Ferritin 626.00High  ng/mL           10/23/2020 0745 10/23/2020 0920 Lactate Dehydrogenase [[id number]]   (Abnormal)   Blood    Final result Component Value Units   LDH 293High   U/L           10/23/2020 0745 10/23/2020 0913 D-dimer, Quantitative [[id number]]    (Abnormal)   Blood    Final result Component Value Units   D-Dimer, Quant 0.70High  MCGFEU/mL           10/23/2020 0745 10/23/2020 0918 Comprehensive Metabolic Panel [[id number]]    (Abnormal)   Blood    Final result Component Value Units   Glucose 295High  mg/dL   BUN 14 mg/dL   Creatinine 0.56Low  mg/dL   Sodium 135Low  mmol/L   Potassium 4.1 mmol/L   Chloride 99 mmol/L   CO2 28.8 mmol/L   Calcium 8.7 mg/dL   Total Protein 5.4Low  g/dL   Albumin 3.40Low  g/dL   ALT (SGPT) 31 U/L   AST (SGOT) 25 U/L   Alkaline Phosphatase 63 U/L   Total Bilirubin 0.3 mg/dL   eGFR Non African Am 146 mL/min/1.73   Globulin 2.0 gm/dL   A/G Ratio 1.7 g/dL   BUN/Creatinine Ratio 25.0    Anion Gap 7.2 mmol/L           10/23/2020 0745 10/23/2020 0918 CK [[id number]]   Blood    Final result Component Value Units   Creatine Kinase 42 U/L           10/23/2020 0745 10/23/2020 0918 C-reactive Protein [[id number]]   Blood    Final result Component Value Units   C-Reactive Protein 0.27 mg/dL           10/23/2020 0745 10/23/2020 0854 CBC Auto Differential [[id number]]   (Abnormal)   Blood    Final result Component Value Units   WBC 6.98 10*3/mm3   RBC 4.84 10*6/mm3   Hemoglobin 14.4 g/dL   Hematocrit 41.8 %   MCV 86.4 fL   MCH 29.8 pg   MCHC 34.4 g/dL   RDW 13.0 %   RDW-SD 40.2 fl   MPV 11.7 fL   Platelets 173 10*3/mm3   Neutrophil Rel % 75.5 %   Lymphocyte Rel  % 13.3Low  %   Monocyte Rel % 7.3 %   Eosinophil Rel % 0.0Low  %   Basophil Rel % 0.3 %        10/19/2020 0834 10/19/2020 0949 Hemoglobin A1c [[id number]]    (Abnormal)   Blood    Final result Component Value Units   Hemoglobin A1C 11.20High            PHYSICAL EXAM  Objective    Alert  nad  No resp distress  Asking to be discharged    CONDITION ON DISCHARGE  Stable.      DISCHARGE DISPOSITION   Home or Self Care      DISCHARGE MEDICATIONS       Your medication list      START taking these medications      Instructions Last Dose Given Next Dose Due   Insulin Glargine 100 UNIT/ML injection pen  Commonly known as: LANTUS SOLOSTAR      Inject 40 Units under the skin into the appropriate area as directed Every Night.          CONTINUE taking these medications      Instructions Last Dose Given Next Dose Due   losartan 50 MG tablet  Commonly known as: COZAAR      Take 100 mg by mouth Every Night.       metFORMIN 1000 MG tablet  Commonly known as: GLUCOPHAGE      Take 2,000 mg by mouth Every Night.       tamsulosin 0.4 MG capsule 24 hr capsule  Commonly known as: FLOMAX      Take 1 capsule by mouth Every Night.       Vascepa 1 g capsule capsule  Generic drug: icosapent ethyl      Take 2 g by mouth Every Night.          STOP taking these medications    amLODIPine 10 MG tablet  Commonly known as: NORVASC              Where to Get Your Medications      These medications were sent to [id number] - [place], [place] - [address] AT [facility] [address] - [phone]  - [phone] FX  [address] [facility] [address]    Phone: [phone]   · Insulin Glargine 100 UNIT/ML injection pen        No future appointments.  Additional Instructions for the Follow-ups that You Need to Schedule     Discharge Follow-up with Specialty: PCP in 2 weeks. Cardiology in 2 weeks, Call endocriniologist in 2 days and let them know what your sugars have been running for further adjustements   As directed       Specialty: PCP in 2 weeks. Cardiology in 2 weeks, Call endocriniologist in 2 days and let them know what your sugars have been running for further adjustements            Contact information for follow-up providers     Nena Maxwell MD .    Specialty: Internal Medicine  Contact information:  [address]  [facility] [address]  [facility] [address]  [phone]                   Contact information for after-discharge care     Home Medical Care     [facility] .    Service: Home Health Services  Contact information:  [address] [facility] [address]  [facility] [address] [phone]                             TEST  RESULTS PENDING AT DISCHARGE         Riley Chang MD  [facility]  10/24/20  13:05 EDT      Time: greater than 32 minutes on discharge  It was a pleasure taking care of this patient while in the hospital.       Electronically signed by Riley Chang MD at 10/24/20 9940

## 2020-10-30 ENCOUNTER — READMISSION MANAGEMENT (OUTPATIENT)
Dept: CALL CENTER | Facility: HOSPITAL | Age: 65
End: 2020-10-30

## 2020-10-30 NOTE — OUTREACH NOTE
COVID-19 Week 1 Survey      Responses   [facility] patient discharged from?  [place]   Does the patient have one of the following disease processes/diagnoses(primary or secondary)?  COVID-19   COVID-19 underlying condition?  None   Call Number  Call 4   Week 1 Call successful?  No   Discharge diagnosis  PNA due to Covid 19          Magaly Sandra RN

## 2020-11-02 ENCOUNTER — READMISSION MANAGEMENT (OUTPATIENT)
Dept: CALL CENTER | Facility: HOSPITAL | Age: 65
End: 2020-11-02

## 2020-11-02 NOTE — OUTREACH NOTE
COVID-19 Week 2 Survey      Responses   [facility] patient discharged from?  [place]   Does the patient have one of the following disease processes/diagnoses(primary or secondary)?  COVID-19   COVID-19 underlying condition?  None   Call Number  Call 1   COVID-19 Week 2: Call 1 attempt successful?  No   Discharge diagnosis  PNA due to Covid 19          Magaly Sandra RN

## 2020-11-05 ENCOUNTER — READMISSION MANAGEMENT (OUTPATIENT)
Dept: CALL CENTER | Facility: HOSPITAL | Age: 65
End: 2020-11-05

## 2020-11-05 NOTE — OUTREACH NOTE
COVID-19 Week 2 Survey      Responses   [facility] patient discharged from?  [place]   Does the patient have one of the following disease processes/diagnoses(primary or secondary)?  COVID-19   COVID-19 underlying condition?  None   Call Number  Call 2   COVID-19 Week 2: Call 1 attempt successful?  No   Discharge diagnosis  PNA due to Covid 19          Yaneth Suazo LPN

## 2020-11-12 ENCOUNTER — READMISSION MANAGEMENT (OUTPATIENT)
Dept: CALL CENTER | Facility: HOSPITAL | Age: 65
End: 2020-11-12

## 2020-11-12 NOTE — OUTREACH NOTE
COVID-19 Week 3 Survey      Responses   [facility] patient discharged from?  [place]   Does the patient have one of the following disease processes/diagnoses(primary or secondary)?  COVID-19   COVID-19 underlying condition?  None   Call Number  Call 1   COVID-19 Week 3: Call 1 attempt successful?  No   Discharge diagnosis  PNA due to Covid 19          Yaneth Suazo LPN

## 2020-11-19 ENCOUNTER — READMISSION MANAGEMENT (OUTPATIENT)
Dept: CALL CENTER | Facility: HOSPITAL | Age: 65
End: 2020-11-19

## 2020-11-19 NOTE — OUTREACH NOTE
COVID-19 Week 4 Survey      Responses   [facility] patient discharged from?  [place]   Does the patient have one of the following disease processes/diagnoses(primary or secondary)?  COVID-19   COVID-19 underlying condition?  None   Call Number  Call 1   COVID-19 Week 4: Call 1 attempt successful?  No   Discharge diagnosis  PNA due to Covid 19          Jagdish Garzon RN